# Patient Record
Sex: FEMALE | Race: WHITE | Employment: OTHER | URBAN - METROPOLITAN AREA
[De-identification: names, ages, dates, MRNs, and addresses within clinical notes are randomized per-mention and may not be internally consistent; named-entity substitution may affect disease eponyms.]

---

## 2022-09-17 ENCOUNTER — APPOINTMENT (OUTPATIENT)
Dept: CT IMAGING | Age: 77
End: 2022-09-17
Payer: MEDICARE

## 2022-09-17 ENCOUNTER — APPOINTMENT (OUTPATIENT)
Dept: GENERAL RADIOLOGY | Age: 77
End: 2022-09-17
Payer: MEDICARE

## 2022-09-17 ENCOUNTER — HOSPITAL ENCOUNTER (EMERGENCY)
Age: 77
Discharge: HOME | End: 2022-09-17
Attending: EMERGENCY MEDICINE
Payer: MEDICARE

## 2022-09-17 VITALS
OXYGEN SATURATION: 97 % | HEIGHT: 67 IN | DIASTOLIC BLOOD PRESSURE: 89 MMHG | TEMPERATURE: 98 F | RESPIRATION RATE: 20 BRPM | HEART RATE: 86 BPM | BODY MASS INDEX: 20.4 KG/M2 | SYSTOLIC BLOOD PRESSURE: 167 MMHG | WEIGHT: 130 LBS

## 2022-09-17 DIAGNOSIS — S01.21XA LACERATION OF NOSE, INITIAL ENCOUNTER: ICD-10-CM
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
## 2022-09-17 DIAGNOSIS — S00.83XA CONTUSION OF FOREHEAD, INITIAL ENCOUNTER: ICD-10-CM

## 2022-09-17 PROCEDURE — 99284 EMERGENCY DEPT VISIT MOD MDM: CPT

## 2022-09-17 PROCEDURE — 72125 CT NECK SPINE W/O DYE: CPT

## 2022-09-17 PROCEDURE — 70450 CT HEAD/BRAIN W/O DYE: CPT

## 2022-09-17 PROCEDURE — 90714 TD VACC NO PRESV 7 YRS+ IM: CPT | Performed by: EMERGENCY MEDICINE

## 2022-09-17 PROCEDURE — 72190 X-RAY EXAM OF PELVIS: CPT

## 2022-09-17 PROCEDURE — 6360000002 HC RX W HCPCS: Performed by: EMERGENCY MEDICINE

## 2022-09-17 PROCEDURE — 12013 RPR F/E/E/N/L/M 2.6-5.0 CM: CPT

## 2022-09-17 PROCEDURE — 90471 IMMUNIZATION ADMIN: CPT | Performed by: EMERGENCY MEDICINE

## 2022-09-17 PROCEDURE — 72170 X-RAY EXAM OF PELVIS: CPT

## 2022-09-17 RX ORDER — INSULIN LISPRO 100 [IU]/ML
INJECTION, SOLUTION INTRAVENOUS; SUBCUTANEOUS
COMMUNITY
Start: 2022-07-12

## 2022-09-17 RX ORDER — INSULIN GLARGINE 100 [IU]/ML
25 INJECTION, SOLUTION SUBCUTANEOUS NIGHTLY
COMMUNITY
Start: 2022-06-20

## 2022-09-17 RX ORDER — PAROXETINE HYDROCHLORIDE 20 MG/1
TABLET, FILM COATED ORAL
COMMUNITY
Start: 2022-08-23

## 2022-09-17 RX ORDER — LISINOPRIL 10 MG/1
TABLET ORAL
COMMUNITY

## 2022-09-17 RX ORDER — TRAZODONE HYDROCHLORIDE 50 MG/1
TABLET ORAL
COMMUNITY
Start: 2022-08-23

## 2022-09-17 RX ORDER — ALBUTEROL SULFATE 90 UG/1
1 AEROSOL, METERED RESPIRATORY (INHALATION) EVERY 6 HOURS PRN
COMMUNITY
Start: 2021-06-10

## 2022-09-17 RX ORDER — LEVETIRACETAM 500 MG/1
TABLET ORAL
COMMUNITY
Start: 2022-05-13

## 2022-09-17 RX ORDER — CYANOCOBALAMIN 1000 UG/ML
1000 INJECTION INTRAMUSCULAR; SUBCUTANEOUS
COMMUNITY
Start: 2022-08-12 | End: 2023-08-12

## 2022-09-17 RX ORDER — ATORVASTATIN CALCIUM 20 MG/1
20 TABLET, FILM COATED ORAL DAILY
COMMUNITY
Start: 2022-01-12 | End: 2023-01-12

## 2022-09-17 RX ORDER — LISINOPRIL 40 MG/1
TABLET ORAL
COMMUNITY
Start: 2022-08-23

## 2022-09-17 RX ORDER — DILTIAZEM HYDROCHLORIDE 180 MG/1
CAPSULE, COATED, EXTENDED RELEASE ORAL
COMMUNITY

## 2022-09-17 RX ADMIN — CLOSTRIDIUM TETANI TOXOID ANTIGEN (FORMALDEHYDE INACTIVATED) AND CORYNEBACTERIUM DIPHTHERIAE TOXOID ANTIGEN (FORMALDEHYDE INACTIVATED) 0.5 ML: 5; 2 INJECTION, SUSPENSION INTRAMUSCULAR at 11:39

## 2022-09-17 NOTE — ED NOTES
Pt discharged home, pt verbalized discharge instructions. Pt and  escorted to ed exit via wheelchair.  arranged to  pt and .        Chalino Thakur RN  09/17/22 5318

## 2022-09-17 NOTE — ED NOTES
Pt's  brought here by the owner of the garage sale. Rabia Mckeon [phone]. He is going to take the patiens car back to their home and will bring the keys up to the hospital.  Pt and pts  will need a ride back to their home at [address] [facility]  Address updated in pt demographics. Wound care completed with assistance of [facility] tech.    at bedside     [facility]  09/17/22 1378

## 2022-09-17 NOTE — ED PROVIDER NOTES
[address] [facility]  EMERGENCY DEPARTMENTENCOUNTER      Pt Name: Jairo Varela  MRN: [id number]  Thaliagfcarine 1945  Date ofevaluation: 9/17/2022  Provider: Judith Thornton MD    CHIEF COMPLAINT       Chief Complaint   Patient presents with    Head Injury    Fall     Pt was at a yard sale and fell, recent left hip replacement. Pt reports multiple falls since the hip replacement. Laceration noted face, forehead and rt arm. Per pt this was a mechanical fall. Pt denies loc, no blood thinners. HPI    HISTORY OF PRESENT ILLNESS   (Location/Symptom, Timing/Onset,Context/Setting, Quality, Duration, Modifying Factors, Severity)  Note limiting factors. Jairo Varela is a 68 y.o. female who presents to the emergency department after a fall. This is a 20-year-old female with a history of frequent falls who presents after a fall. The patient states she tripped at a yard sale falling hitting her face. This happened prior to arrival.  She denies any loss of consciousness. She denies any neck pain. She denies any other complaints. NursingNotes were reviewed. Review of Systems    REVIEW OF SYSTEMS    (2-9 systems for level 4, 10 or more for level 5)     Review of Systems   Constitutional: Negative for fever. HENT: Negative for rhinorrhea and sore throat. Eyes: Negative for redness. Respiratory: Negative for shortness of breath. Cardiovascular: Negative for chest pain. Gastrointestinal: Negative for abdominal pain. Genitourinary: Negative for flank pain. Neurological: Negative for headaches. Hematological: Negative for adenopathy. Psychiatric/Behavioral: Negative for confusion. Except as noted above the remainder of the review of systems was reviewed and negative.        PAST MEDICAL HISTORY     Past Medical History:   Diagnosis Date    CAD (coronary artery disease)     Diabetes mellitus ([facility] [address]     Hyperlipidemia     Hypertension     Seizures ([facility]          SURGICALHISTORY       Past Surgical History:   Procedure Laterality Date    CHOLECYSTECTOMY      FRACTURE SURGERY      JOINT REPLACEMENT           CURRENT MEDICATIONS       Previous Medications    ALBUTEROL SULFATE HFA (PROVENTIL;VENTOLIN;PROAIR) 108 (90 BASE) MCG/ACT INHALER    Inhale 1 puff into the lungs every 6 hours as needed    ATORVASTATIN (LIPITOR) 20 MG TABLET    Take 20 mg by mouth daily    CYANOCOBALAMIN 1000 MCG/ML INJECTION    Inject 1,000 mcg into the muscle    DILTIAZEM (CARTIA XT) 180 MG EXTENDED RELEASE CAPSULE    Cartia  mg capsule,extended release   Take 1 capsule every day by oral route. HALOBETASOL (ULTRAVATE) 0.05 % CREAM    halobetasol propionate 0.05 % topical cream    INSULIN GLARGINE (LANTUS) 100 UNIT/ML INJECTION VIAL    Inject 25 Units into the skin nightly    INSULIN LISPRO (HUMALOG) 100 UNIT/ML SOLN INJECTION VIAL    INJECT 10 UNITS SUBCUTANEOUSLY THREE TIMES A DAY BEFORE MEALS    LEVETIRACETAM (KEPPRA) 500 MG TABLET    TAKE 1 TABLET BY MOUTH TWICE DAILY    LISINOPRIL (PRINIVIL;ZESTRIL) 10 MG TABLET    lisinopril 10 mg tablet   Take 1 tablet every day by oral route. LISINOPRIL (PRINIVIL;ZESTRIL) 40 MG TABLET        METFORMIN (GLUCOPHAGE) 500 MG TABLET    TAKE ONE (1) TABLET BY MOUTH TWICE DAILY WITH MEALS FOR DIABETES    PAROXETINE (PAXIL) 20 MG TABLET        TRAZODONE (DESYREL) 50 MG TABLET           ALLERGIES     Oxycodone-acetaminophen    FAMILY HISTORY     History reviewed. No pertinent family history.        SOCIAL HISTORY       Social History     Socioeconomic History    Marital status: Single     Spouse name: None    Number of children: None    Years of education: None    Highest education level: None   Tobacco Use    Smoking status: Never    Smokeless tobacco: Never   Substance and Sexual Activity    Alcohol use: Not Currently       SCREENINGS             PHYSICAL EXAM    (up to 7 for level 4, 8 or more for level 5)     ED Triage Vitals [09/17/22 1111] BP Temp Temp Source Heart Rate Resp SpO2 Height Weight   (!) 160/88 98 °F (36.7 °C) Oral 100 20 97 % 5' 7\" (1.702 m) 130 lb (59 kg)       Physical Exam:      General Appearance:  Alert, cooperative, appears stated age. Head:  Normocephalic, without obvious abnormality, large frontal contusion noted. Various abrasions. Laceration to her nose noted. .   Eyes:  conjunctiva/corneas clear, EOM's intact. Sclera anicteric. ENT: Mucous remains moist and pink. 3 cm laceration to the bridge of the nose. Neck: Supple, symmetrical, trachea midline, no adenopathy. No jugular venous distention. Lungs:   Clear to auscultation bilaterally   Chest Wall:  No pain to palpation   Heart:   Genitourinary: Regular rate rhythm with no murmurs rubs gallops  Unremarkable   Abdomen:   Soft and benign. No guarding or rebound. Extremities: No clubbing cyanosis or edema   Pulses: Good throughout   Skin:  No rashes or lesions to exposed skin. Neurologic: Alert and oriented X 3. GCS of 15. DIAGNOSTIC RESULTS         RADIOLOGY:   Non-plain filmimages such as CT, Ultrasound and MRI are read by the radiologist. Plain radiographic images are visualized and preliminarily interpreted by the emergency physician with the below findings:    See below    Interpretation per the Radiologist below, if available at the time ofthis note: All incidental findings were discussed with the patient. CT CERVICAL SPINE WO CONTRAST   Final Result   Osteoarthrosis at multiple cervical levels. C4-C5, C5-C6, and C6-C7 disc osteophyte complexes. The moderate central spinal stenosis at the level of posterior longitudinal   ligament calcification and the C5-C6 disc osteophyte complex. Minimal retrolisthesis of C4.         CT HEAD WO CONTRAST   Final Result   No acute intracranial findings. XR PELVIS (1-2 VIEWS)   Final Result   1. No acute fracture dislocation.       2.   Prior internal fixation of a right femoral fracture without evident   hardware complication. 3.   Osteopenia. ED BEDSIDE ULTRASOUND:   Performed by ED Physician - none    LABS:  Labs Reviewed - No data to display    All other labs were within normal range or not returned as of this dictation. EMERGENCY DEPARTMENT COURSE and DIFFERENTIAL DIAGNOSIS/MDM:   Vitals:    Vitals:    09/17/22 1111 09/17/22 1130 09/17/22 1140   BP: (!) 160/88 (!) 159/102 (!) 167/89   Pulse: 100 85 86   Resp: 20     Temp: 98 °F (36.7 °C)     TempSrc: Oral     SpO2: 97% 97%    Weight: 130 lb (59 kg)     Height: 5' 7\" (1.702 m)             MDM      The patient has remained stable throughout her hospital course. Her work-up was unremarkable normal.  Her nose does not appear to be deformed. CT of the head and cervical spine showed no acute findings. Pelvis x-ray shows no acute findings. She can lift both her legs off the bed without difficulty and she is able to ambulate. Her falls are not new, she has a history of this. The patient's nose had a 3 cm laceration that was repaired without difficulty. Please see procedure note. The patient was discharged with appropriate follow-up and instructed to return if worse. REASSESSMENT              CONSULTS:  None    PROCEDURES:  Unless otherwise noted below, none     Procedures  The patient's nose was cleaned and numbed with 1% lidocaine with epinephrine. Her wound was explored and there were no foreign objects noted. No open fractures noted. No deformity noted. The area was repaired with 3 interrupted percutaneous 5-0 Ethilon sutures. The patient taught the procedure well with no difficulties. She was discharged with wound care instructions and sutures out in 5 to 7 days. FINAL IMPRESSION      1. Fall, initial encounter    2. Contusion of forehead, initial encounter    3.  Laceration of nose, initial encounter          DISPOSITION/PLAN   DISPOSITION Decision To Discharge 09/17/2022 01:53:40 PM      PATIENT REFERREDTO:  Zachariah Kern, DO  [address] [place] [address] [facility] [phone]    Call in 2 days  As needed    Zachariah Kern, DO  [address] [place] [address] [facility] [address] [phone]    Go in 1 week  For suture removal      DISCHARGEMEDICATIONS:  New Prescriptions    No medications on file     Controlled Substances Monitoring:     No flowsheet data found.     (Please note that portions of this note were completed with a voice recognition program.  Efforts were made to edit the dictations but occasionally words are mis-transcribed.)    Arpita Pabon MD (electronically signed)  Attending Emergency Physician         Arpita Pabon MD  09/17/22 5173

## 2024-07-25 ENCOUNTER — APPOINTMENT (OUTPATIENT)
Dept: GENERAL RADIOLOGY | Age: 79
End: 2024-07-25
Payer: MEDICARE

## 2024-07-25 ENCOUNTER — APPOINTMENT (OUTPATIENT)
Age: 79
End: 2024-07-25
Attending: INTERNAL MEDICINE
Payer: MEDICARE

## 2024-07-25 ENCOUNTER — HOSPITAL ENCOUNTER (OUTPATIENT)
Age: 79
Setting detail: OBSERVATION
Discharge: NURSING, REHAB OR LONG-TERM CARE | End: 2024-07-26
Attending: EMERGENCY MEDICINE | Admitting: INTERNAL MEDICINE
Payer: MEDICARE

## 2024-07-25 ENCOUNTER — APPOINTMENT (OUTPATIENT)
Dept: CT IMAGING | Age: 79
End: 2024-07-25
Payer: MEDICARE

## 2024-07-25 DIAGNOSIS — S40.022A CONTUSION OF MULTIPLE SITES OF LEFT SHOULDER AND UPPER ARM, INITIAL ENCOUNTER: ICD-10-CM

## 2024-07-25 DIAGNOSIS — S40.012A CONTUSION OF MULTIPLE SITES OF LEFT SHOULDER AND UPPER ARM, INITIAL ENCOUNTER: ICD-10-CM

## 2024-07-25 DIAGNOSIS — R55 SYNCOPE AND COLLAPSE: ICD-10-CM

## 2024-07-25 DIAGNOSIS — S09.90XA CLOSED HEAD INJURY, INITIAL ENCOUNTER: ICD-10-CM
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
## 2024-07-25 DIAGNOSIS — R79.89 ELEVATED TROPONIN: ICD-10-CM

## 2024-07-25 DIAGNOSIS — R55 SYNCOPE, UNSPECIFIED SYNCOPE TYPE: ICD-10-CM

## 2024-07-25 LAB
ALBUMIN SERPL-MCNC: 4.6 G/DL (ref 3.4–5)
ALBUMIN/GLOB SERPL: 1.6 {RATIO} (ref 1.1–2.2)
ALP SERPL-CCNC: 109 U/L (ref 40–129)
ALT SERPL-CCNC: 17 U/L (ref 10–40)
ANION GAP SERPL CALCULATED.3IONS-SCNC: 13 MMOL/L (ref 3–16)
AST SERPL-CCNC: 21 U/L (ref 15–37)
BACTERIA URNS QL MICRO: NORMAL /HPF
BASOPHILS # BLD: 0 K/UL (ref 0–0.2)
BASOPHILS NFR BLD: 0.4 %
BILIRUB SERPL-MCNC: 0.7 MG/DL (ref 0–1)
BILIRUB UR QL STRIP.AUTO: NEGATIVE
BUN SERPL-MCNC: 14 MG/DL (ref 7–20)
CALCIUM SERPL-MCNC: 10 MG/DL (ref 8.3–10.6)
CHLORIDE SERPL-SCNC: 106 MMOL/L (ref 99–110)
CLARITY UR: CLEAR
CO2 SERPL-SCNC: 24 MMOL/L (ref 21–32)
COLOR UR: YELLOW
CREAT SERPL-MCNC: 0.9 MG/DL (ref 0.6–1.2)
DEPRECATED RDW RBC AUTO: 13.6 % (ref 12.4–15.4)
ECHO AO ASC DIAM: 3.4 CM
ECHO AO ASCENDING AORTA INDEX: 1.9 CM/M2
ECHO AO ROOT DIAM: 2.9 CM
ECHO AO ROOT INDEX: 1.62 CM/M2
ECHO AV AREA PEAK VELOCITY: 1.7 CM2
ECHO AV AREA VTI: 1.6 CM2
ECHO AV AREA/BSA PEAK VELOCITY: 0.9 CM2/M2
ECHO AV AREA/BSA VTI: 0.9 CM2/M2
ECHO AV MEAN GRADIENT: 8 MMHG
ECHO AV MEAN VELOCITY: 1.3 M/S
ECHO AV PEAK GRADIENT: 14 MMHG
ECHO AV PEAK VELOCITY: 1.9 M/S
ECHO AV VELOCITY RATIO: 0.58
ECHO AV VTI: 38.8 CM
ECHO BSA: 1.79 M2
ECHO LA AREA 2C: 16.5 CM2
ECHO LA AREA 4C: 15 CM2
ECHO LA DIAMETER INDEX: 2.12 CM/M2
ECHO LA DIAMETER: 3.8 CM
ECHO LA MAJOR AXIS: 5.3 CM
ECHO LA MINOR AXIS: 5.9 CM
ECHO LA TO AORTIC ROOT RATIO: 1.31
ECHO LA VOL BP: 39 ML (ref 22–52)
ECHO LA VOL MOD A2C: 39 ML (ref 22–52)
ECHO LA VOL MOD A4C: 35 ML (ref 22–52)
ECHO LA VOL/BSA BIPLANE: 22 ML/M2 (ref 16–34)
ECHO LA VOLUME INDEX MOD A2C: 22 ML/M2 (ref 16–34)
ECHO LA VOLUME INDEX MOD A4C: 20 ML/M2 (ref 16–34)
ECHO LV E' LATERAL VELOCITY: 5 CM/S
ECHO LV E' SEPTAL VELOCITY: 4 CM/S
ECHO LV EDV A2C: 67 ML
ECHO LV EDV A4C: 55 ML
ECHO LV EDV INDEX A4C: 31 ML/M2
ECHO LV EDV NDEX A2C: 37 ML/M2
ECHO LV EJECTION FRACTION A2C: 55 %
ECHO LV EJECTION FRACTION A4C: 43 %
ECHO LV EJECTION FRACTION BIPLANE: 49 % (ref 55–100)
ECHO LV ESV A2C: 30 ML
ECHO LV ESV A4C: 32 ML
ECHO LV ESV INDEX A2C: 17 ML/M2
ECHO LV ESV INDEX A4C: 18 ML/M2
ECHO LV FRACTIONAL SHORTENING: 32 % (ref 28–44)
ECHO LV INTERNAL DIMENSION DIASTOLE INDEX: 2.29 CM/M2
ECHO LV INTERNAL DIMENSION DIASTOLIC: 4.1 CM (ref 3.9–5.3)
ECHO LV INTERNAL DIMENSION SYSTOLIC INDEX: 1.56 CM/M2
ECHO LV INTERNAL DIMENSION SYSTOLIC: 2.8 CM
ECHO LV IVSD: 1 CM (ref 0.6–0.9)
ECHO LV MASS 2D: 141.5 G (ref 67–162)
ECHO LV MASS INDEX 2D: 79.1 G/M2 (ref 43–95)
ECHO LV POSTERIOR WALL DIASTOLIC: 1.1 CM (ref 0.6–0.9)
ECHO LV RELATIVE WALL THICKNESS RATIO: 0.54
ECHO LVOT AREA: 2.8 CM2
ECHO LVOT AV VTI INDEX: 0.55
ECHO LVOT DIAM: 1.9 CM
ECHO LVOT MEAN GRADIENT: 3 MMHG
ECHO LVOT PEAK GRADIENT: 5 MMHG
ECHO LVOT PEAK VELOCITY: 1.1 M/S
ECHO LVOT STROKE VOLUME INDEX: 33.6 ML/M2
ECHO LVOT SV: 60.1 ML
ECHO LVOT VTI: 21.2 CM
ECHO MV A VELOCITY: 1.26 M/S
ECHO MV AREA VTI: 2.4 CM2
ECHO MV E VELOCITY: 0.62 M/S
ECHO MV E/A RATIO: 0.49
ECHO MV E/E' LATERAL: 12.4
ECHO MV E/E' RATIO (AVERAGED): 13.95
ECHO MV E/E' SEPTAL: 15.5
ECHO MV LVOT VTI INDEX: 1.19
ECHO MV MAX VELOCITY: 1.2 M/S
ECHO MV MEAN GRADIENT: 1 MMHG
ECHO MV MEAN VELOCITY: 0.5 M/S
ECHO MV PEAK GRADIENT: 6 MMHG
ECHO MV VTI: 25.3 CM
ECHO PV MAX VELOCITY: 0.8 M/S
ECHO PV PEAK GRADIENT: 3 MMHG
ECHO RA AREA 4C: 12 CM2
ECHO RA END SYSTOLIC VOLUME APICAL 4 CHAMBER INDEX BSA: 13 ML/M2
ECHO RA VOLUME: 24 ML
ECHO RV BASAL DIMENSION: 3.1 CM
ECHO RV FREE WALL PEAK S': 14 CM/S
ECHO RV MID DIMENSION: 1.7 CM
ECHO RV TAPSE: 2.2 CM (ref 1.7–?)
EOSINOPHIL # BLD: 0.1 K/UL (ref 0–0.6)
EOSINOPHIL NFR BLD: 1.4 %
EPI CELLS #/AREA URNS AUTO: 1 /HPF (ref 0–5)
FOLATE SERPL-MCNC: 18.05 NG/ML (ref 4.78–24.2)
GFR SERPLBLD CREATININE-BSD FMLA CKD-EPI: 65 ML/MIN/{1.73_M2}
GLUCOSE BLD-MCNC: 132 MG/DL (ref 70–99)
GLUCOSE BLD-MCNC: 149 MG/DL (ref 70–99)
GLUCOSE BLD-MCNC: 212 MG/DL (ref 70–99)
GLUCOSE SERPL-MCNC: 174 MG/DL (ref 70–99)
GLUCOSE UR STRIP.AUTO-MCNC: >=1000 MG/DL
HCT VFR BLD AUTO: 36.5 % (ref 36–48)
HGB BLD-MCNC: 12.6 G/DL (ref 12–16)
HGB UR QL STRIP.AUTO: NEGATIVE
HYALINE CASTS #/AREA URNS AUTO: 1 /LPF (ref 0–8)
KETONES UR STRIP.AUTO-MCNC: NEGATIVE MG/DL
LEUKOCYTE ESTERASE UR QL STRIP.AUTO: NEGATIVE
LYMPHOCYTES # BLD: 2 K/UL (ref 1–5.1)
LYMPHOCYTES NFR BLD: 30.2 %
MCH RBC QN AUTO: 32.4 PG (ref 26–34)
MCHC RBC AUTO-ENTMCNC: 34.5 G/DL (ref 31–36)
MCV RBC AUTO: 93.9 FL (ref 80–100)
MONOCYTES # BLD: 0.5 K/UL (ref 0–1.3)
MONOCYTES NFR BLD: 7.6 %
NEUTROPHILS # BLD: 4 K/UL (ref 1.7–7.7)
NEUTROPHILS NFR BLD: 60.4 %
NITRITE UR QL STRIP.AUTO: NEGATIVE
PERFORMED ON: ABNORMAL
PH UR STRIP.AUTO: 5.5 [PH] (ref 5–8)
PLATELET # BLD AUTO: 132 K/UL (ref 135–450)
PMV BLD AUTO: 7.4 FL (ref 5–10.5)
POTASSIUM SERPL-SCNC: 4.7 MMOL/L (ref 3.5–5.1)
PROT SERPL-MCNC: 7.5 G/DL (ref 6.4–8.2)
PROT UR STRIP.AUTO-MCNC: ABNORMAL MG/DL
RBC # BLD AUTO: 3.89 M/UL (ref 4–5.2)
RBC CLUMPS #/AREA URNS AUTO: 0 /HPF (ref 0–4)
SODIUM SERPL-SCNC: 143 MMOL/L (ref 136–145)
SP GR UR STRIP.AUTO: 1.02 (ref 1–1.03)
TROPONIN, HIGH SENSITIVITY: 17 NG/L (ref 0–14)
TROPONIN, HIGH SENSITIVITY: 20 NG/L (ref 0–14)
TSH SERPL DL<=0.005 MIU/L-ACNC: 1.13 UIU/ML (ref 0.27–4.2)
UA COMPLETE W REFLEX CULTURE PNL UR: ABNORMAL
UA DIPSTICK W REFLEX MICRO PNL UR: YES
URN SPEC COLLECT METH UR: ABNORMAL
UROBILINOGEN UR STRIP-ACNC: 1 E.U./DL
VIT B12 SERPL-MCNC: 684 PG/ML (ref 211–911)
WBC # BLD AUTO: 6.7 K/UL (ref 4–11)
WBC #/AREA URNS AUTO: 2 /HPF (ref 0–5)

## 2024-07-25 PROCEDURE — 2580000003 HC RX 258: Performed by: INTERNAL MEDICINE

## 2024-07-25 PROCEDURE — 70450 CT HEAD/BRAIN W/O DYE: CPT

## 2024-07-25 PROCEDURE — 93306 TTE W/DOPPLER COMPLETE: CPT

## 2024-07-25 PROCEDURE — 73630 X-RAY EXAM OF FOOT: CPT

## 2024-07-25 PROCEDURE — 6370000000 HC RX 637 (ALT 250 FOR IP): Performed by: INTERNAL MEDICINE

## 2024-07-25 PROCEDURE — 93306 TTE W/DOPPLER COMPLETE: CPT | Performed by: INTERNAL MEDICINE

## 2024-07-25 PROCEDURE — 99285 EMERGENCY DEPT VISIT HI MDM: CPT

## 2024-07-25 PROCEDURE — 81001 URINALYSIS AUTO W/SCOPE: CPT

## 2024-07-25 PROCEDURE — 93005 ELECTROCARDIOGRAM TRACING: CPT | Performed by: EMERGENCY MEDICINE

## 2024-07-25 PROCEDURE — 96372 THER/PROPH/DIAG INJ SC/IM: CPT

## 2024-07-25 PROCEDURE — 82746 ASSAY OF FOLIC ACID SERUM: CPT

## 2024-07-25 PROCEDURE — 84484 ASSAY OF TROPONIN QUANT: CPT

## 2024-07-25 PROCEDURE — 84443 ASSAY THYROID STIM HORMONE: CPT

## 2024-07-25 PROCEDURE — 85025 COMPLETE CBC W/AUTO DIFF WBC: CPT

## 2024-07-25 PROCEDURE — 82607 VITAMIN B-12: CPT

## 2024-07-25 PROCEDURE — 72125 CT NECK SPINE W/O DYE: CPT

## 2024-07-25 PROCEDURE — G0378 HOSPITAL OBSERVATION PER HR: HCPCS

## 2024-07-25 PROCEDURE — 73610 X-RAY EXAM OF ANKLE: CPT

## 2024-07-25 PROCEDURE — 71045 X-RAY EXAM CHEST 1 VIEW: CPT

## 2024-07-25 PROCEDURE — 36415 COLL VENOUS BLD VENIPUNCTURE: CPT

## 2024-07-25 PROCEDURE — 80053 COMPREHEN METABOLIC PANEL: CPT

## 2024-07-25 PROCEDURE — 73030 X-RAY EXAM OF SHOULDER: CPT

## 2024-07-25 PROCEDURE — 6360000002 HC RX W HCPCS: Performed by: INTERNAL MEDICINE

## 2024-07-25 RX ORDER — POTASSIUM CHLORIDE 750 MG/1
10 TABLET, EXTENDED RELEASE ORAL DAILY
COMMUNITY

## 2024-07-25 RX ORDER — FERROUS SULFATE 325(65) MG
325 TABLET ORAL
COMMUNITY

## 2024-07-25 RX ORDER — DILTIAZEM HYDROCHLORIDE 180 MG/1
180 CAPSULE, COATED, EXTENDED RELEASE ORAL DAILY
Status: DISCONTINUED | OUTPATIENT
Start: 2024-07-25 | End: 2024-07-26 | Stop reason: HOSPADM

## 2024-07-25 RX ORDER — ACETAMINOPHEN 325 MG/1
650 TABLET ORAL EVERY 6 HOURS PRN
COMMUNITY

## 2024-07-25 RX ORDER — INSULIN GLARGINE 100 [IU]/ML
25 INJECTION, SOLUTION SUBCUTANEOUS NIGHTLY
Status: DISCONTINUED | OUTPATIENT
Start: 2024-07-25 | End: 2024-07-26 | Stop reason: HOSPADM

## 2024-07-25 RX ORDER — EMPAGLIFLOZIN 10 MG/1
10 TABLET, FILM COATED ORAL DAILY
COMMUNITY
Start: 2024-07-22

## 2024-07-25 RX ORDER — SODIUM CHLORIDE 9 MG/ML
INJECTION, SOLUTION INTRAVENOUS PRN
Status: DISCONTINUED | OUTPATIENT
Start: 2024-07-25 | End: 2024-07-26 | Stop reason: HOSPADM

## 2024-07-25 RX ORDER — ACETAMINOPHEN 325 MG/1
650 TABLET ORAL EVERY 6 HOURS PRN
Status: DISCONTINUED | OUTPATIENT
Start: 2024-07-25 | End: 2024-07-26 | Stop reason: HOSPADM

## 2024-07-25 RX ORDER — DICYCLOMINE HYDROCHLORIDE 10 MG/1
10 CAPSULE ORAL
COMMUNITY

## 2024-07-25 RX ORDER — LANOLIN ALCOHOL/MO/W.PET/CERES
3 CREAM (GRAM) TOPICAL NIGHTLY PRN
COMMUNITY

## 2024-07-25 RX ORDER — ALBUTEROL SULFATE 90 UG/1
1 AEROSOL, METERED RESPIRATORY (INHALATION) EVERY 6 HOURS PRN
Status: DISCONTINUED | OUTPATIENT
Start: 2024-07-25 | End: 2024-07-26 | Stop reason: HOSPADM

## 2024-07-25 RX ORDER — ENOXAPARIN SODIUM 100 MG/ML
40 INJECTION SUBCUTANEOUS DAILY
Status: DISCONTINUED | OUTPATIENT
Start: 2024-07-25 | End: 2024-07-26 | Stop reason: HOSPADM

## 2024-07-25 RX ORDER — INSULIN LISPRO 100 [IU]/ML
0-12 INJECTION, SOLUTION INTRAVENOUS; SUBCUTANEOUS
COMMUNITY

## 2024-07-25 RX ORDER — POLYETHYLENE GLYCOL 3350 17 G/17G
17 POWDER, FOR SOLUTION ORAL DAILY PRN
Status: DISCONTINUED | OUTPATIENT
Start: 2024-07-25 | End: 2024-07-26 | Stop reason: HOSPADM

## 2024-07-25 RX ORDER — CALCIUM POLYCARBOPHIL 625 MG 625 MG/1
625 TABLET ORAL DAILY
COMMUNITY

## 2024-07-25 RX ORDER — SODIUM CHLORIDE 0.9 % (FLUSH) 0.9 %
5-40 SYRINGE (ML) INJECTION PRN
Status: DISCONTINUED | OUTPATIENT
Start: 2024-07-25 | End: 2024-07-26 | Stop reason: HOSPADM

## 2024-07-25 RX ORDER — SODIUM CHLORIDE 0.9 % (FLUSH) 0.9 %
5-40 SYRINGE (ML) INJECTION EVERY 12 HOURS SCHEDULED
Status: DISCONTINUED | OUTPATIENT
Start: 2024-07-25 | End: 2024-07-26 | Stop reason: HOSPADM

## 2024-07-25 RX ORDER — ACETAMINOPHEN 650 MG/1
650 SUPPOSITORY RECTAL EVERY 6 HOURS PRN
Status: DISCONTINUED | OUTPATIENT
Start: 2024-07-25 | End: 2024-07-26 | Stop reason: HOSPADM

## 2024-07-25 RX ORDER — METFORMIN HYDROCHLORIDE 1000 MG/1
1000 TABLET, FILM COATED, EXTENDED RELEASE ORAL
COMMUNITY

## 2024-07-25 RX ORDER — INSULIN LISPRO 100 [IU]/ML
10 INJECTION, SOLUTION INTRAVENOUS; SUBCUTANEOUS
Status: DISCONTINUED | OUTPATIENT
Start: 2024-07-25 | End: 2024-07-26 | Stop reason: HOSPADM

## 2024-07-25 RX ORDER — LEVETIRACETAM 500 MG/1
500 TABLET ORAL 2 TIMES DAILY
Status: DISCONTINUED | OUTPATIENT
Start: 2024-07-25 | End: 2024-07-26 | Stop reason: HOSPADM

## 2024-07-25 RX ORDER — DONEPEZIL HYDROCHLORIDE 5 MG/1
5 TABLET, FILM COATED ORAL DAILY
COMMUNITY

## 2024-07-25 RX ADMIN — INSULIN LISPRO 10 UNITS: 100 INJECTION, SOLUTION INTRAVENOUS; SUBCUTANEOUS at 12:06

## 2024-07-25 RX ADMIN — INSULIN GLARGINE 25 UNITS: 100 INJECTION, SOLUTION SUBCUTANEOUS at 20:45

## 2024-07-25 RX ADMIN — LEVETIRACETAM 500 MG: 500 TABLET, FILM COATED ORAL at 20:45

## 2024-07-25 RX ADMIN — ENOXAPARIN SODIUM 40 MG: 40 INJECTION SUBCUTANEOUS at 10:53

## 2024-07-25 RX ADMIN — Medication 10 ML: at 10:08

## 2024-07-25 RX ADMIN — Medication 10 ML: at 20:45

## 2024-07-25 RX ADMIN — LEVETIRACETAM 500 MG: 500 TABLET, FILM COATED ORAL at 10:53

## 2024-07-25 RX ADMIN — DILTIAZEM HYDROCHLORIDE 180 MG: 180 CAPSULE, COATED, EXTENDED RELEASE ORAL at 10:53

## 2024-07-25 RX ADMIN — INSULIN LISPRO 10 UNITS: 100 INJECTION, SOLUTION INTRAVENOUS; SUBCUTANEOUS at 16:44

## 2024-07-25 ASSESSMENT — ENCOUNTER SYMPTOMS
GASTROINTESTINAL NEGATIVE: 1
SORE THROAT: 0
ABDOMINAL PAIN: 0
SHORTNESS OF BREATH: 0
RESPIRATORY NEGATIVE: 1

## 2024-07-25 NOTE — ED PROVIDER NOTES
[facility] EMERGENCY DEPARTMENT  EMERGENCY DEPARTMENT ENCOUNTER        Pt Name: Mckenna Morin  MRN: [id number]  Birthdate 1945  Date of evaluation: 7/25/2024  Provider: Salvatore Montejo MD  PCP: Martinez Casey DO  Note Started: 6:43 AM EDT 7/25/24    CHIEF COMPLAINT       Chief Complaint   Patient presents with    Fall     Brought in by [facility] after she fell on her left side while trying to get around husbands wheelchair. Small bruise on top of left foot. Most pain left shoulder & left foot. Alert & Oriented x4. She reports she is not on blood thinners.        HISTORY OF PRESENT ILLNESS: 1 or more Elements     History from : Patient and EMS    Limitations to history : None    Mckenna Morin is a 79 y.o. female who presents for fall.  Patient states that she has been having syncope recently.  Has been blacking out several times a week.  Patient states she just gets lightheaded and passes out.  States she has been taking care of her .  She states that today she was trying to get around her 's wheelchair she is unsure if she lost her balance or tripped or just passed out.  She remembers most of the fall.  But she does think that she lost consciousness.  Having pain in her left shoulder, left foot.  Currently denies any headache, chest pain, abdominal pain, nausea vomiting or diarrhea.  No dysuria or hematuria.  No fevers or chills.  No recent illness.  Patient has history of CAD, diabetes, hyperlipidemia, hypertension.  Patient has history of seizures but denies that this is similar to a seizure states she remembers the episode did not have a postictal period.    Nursing Notes were all reviewed and agreed with or any disagreements were addressed in the HPI.    REVIEW OF SYSTEMS :      Review of Systems   Constitutional: Negative.  Negative for chills and fever.   HENT:  Negative for congestion and sore throat.    Eyes:  Negative for visual disturbance. 
specified.    DISCHARGE MEDICATIONS:  New Prescriptions    No medications on file     Controlled Substances Monitoring:          No data to display                (Please note that portions of this note were completed with a voice recognition program.  Efforts were made to edit the dictations but occasionally words are mis-transcribed.)    Tammie Ontiveros MD (electronically signed)  Attending Emergency Physician          Tammie Ontiveros MD  07/26/24 0238

## 2024-07-25 NOTE — FLOWSHEET NOTE
Brought in by [facility] after she fell on her left side while trying to get around husbands wheelchair. Small bruise on top of left foot. Most pain left shoulder & left foot. Alert & Oriented x4. She reports she is not on blood thinners.     Patient oriented to room and ED throughput process.  Safety measures with ED bed locked in lowest position and call light in reach.  Patient educated on all orders, including any medications.  Patient educated on chief complaint/symptoms. Patient encouraged to ask questions regarding care, medications or treatment plan.  Patient aware of how to reach staff with questions/concerns.

## 2024-07-25 NOTE — H&P
HOSPITALISTS HISTORY AND PHYSICAL    7/25/2024 2:28 PM    Patient Information:  MCKENNA KEITH is a 79 y.o. female [id number]  PCP:  Martinez Casey DO (Tel: [phone] )    Chief complaint:    Chief Complaint   Patient presents with    Fall     Brought in by [facility] after she fell on her left side while trying to get around husbands wheelchair. Small bruise on top of left foot. Most pain left shoulder & left foot. Alert & Oriented x4. She reports she is not on blood thinners.      History of Present Illness:  Mckenna Keith is a 79 y.o. female who had mechanical fall today when she tripped over her legs as she is walking to take care of her  is on a wheelchair patient unsure if she lost consciousness but had no chest pain shortness breath fever chills nausea vomiting before this patient had multiple falls in the last 1 month.  Does have a history of seizures but no recent witnessed seizure-like activity    REVIEW OF SYSTEMS:   Constitutional: Negative for fever,chills or night sweats  ENT: Negative for rhinorrhea, epistaxis, hoarseness, sore throat.  Respiratory: Negative for shortness of breath,wheezing  Cardiovascular: Negative for chest pain, palpitations   Gastrointestinal: Negative for nausea, vomiting, diarrhea  Genitourinary: Negative for polyuria, dysuria   Hematologic/Lymphatic: Negative for bleeding tendency, easy bruising  Musculoskeletal: Negative for myalgias and arthralgias  Neurologic: Negative for confusion,dysarthria.  Skin: Negative for itching,rash  Psychiatric: Negative for depression,anxiety, agitation.  Endocrine: Negative for polydipsia,polyuria,heat /cold intolerance.    Past Medical History:   has a past medical history of Altered mental status, Anxiety, Arthropathy, CAD (coronary artery disease), Cellulitis, Diabetes mellitus (HCC), Fecal urgency, Hyperlipidemia,

## 2024-07-25 NOTE — ACP (ADVANCE CARE PLANNING)
Advanced Care Planning Note.    Purpose of Encounter: Advanced care planning in light of hospitalization  Parties In Attendance: Patient,    Decisional Capacity: Yes  Subjective: Patient  understand that this conversation is to address long term care goal  Objective: Admitted to the hospital with possible syncopal episodes  Goals of Care Determination: Patient would pursue CPR and Intubation if requiredUnsure about long term ventilation/tracheostomy, would want family to make the decision at the time if required  Code Status: full code  Time spent on Advanced care Plannin minutes  Advanced Care Planning Documents: documented patient's wishes, would like  Bryant to make medical decisions if unable to make decisions    Nikkie Degroot MD  2024 2:32 PM

## 2024-07-26 VITALS
OXYGEN SATURATION: 97 % | SYSTOLIC BLOOD PRESSURE: 168 MMHG | BODY MASS INDEX: 23.54 KG/M2 | DIASTOLIC BLOOD PRESSURE: 83 MMHG | WEIGHT: 150 LBS | HEIGHT: 67 IN | TEMPERATURE: 97.4 F | HEART RATE: 100 BPM | RESPIRATION RATE: 16 BRPM

## 2024-07-26 LAB
ANION GAP SERPL CALCULATED.3IONS-SCNC: 10 MMOL/L (ref 3–16)
BASOPHILS # BLD: 0 K/UL (ref 0–0.2)
BASOPHILS NFR BLD: 0.3 %
BUN SERPL-MCNC: 12 MG/DL (ref 7–20)
CALCIUM SERPL-MCNC: 8.8 MG/DL (ref 8.3–10.6)
CHLORIDE SERPL-SCNC: 105 MMOL/L (ref 99–110)
CO2 SERPL-SCNC: 23 MMOL/L (ref 21–32)
CREAT SERPL-MCNC: 0.8 MG/DL (ref 0.6–1.2)
DEPRECATED RDW RBC AUTO: 13.6 % (ref 12.4–15.4)
EKG ATRIAL RATE: 69 BPM
EKG DIAGNOSIS: NORMAL
EKG P AXIS: 40 DEGREES
EKG P-R INTERVAL: 166 MS
EKG Q-T INTERVAL: 414 MS
EKG QRS DURATION: 78 MS
EKG QTC CALCULATION (BAZETT): 443 MS
EKG R AXIS: -12 DEGREES
EKG T AXIS: 18 DEGREES
EKG VENTRICULAR RATE: 69 BPM
EOSINOPHIL # BLD: 0.1 K/UL (ref 0–0.6)
EOSINOPHIL NFR BLD: 1.5 %
GFR SERPLBLD CREATININE-BSD FMLA CKD-EPI: 75 ML/MIN/{1.73_M2}
GLUCOSE BLD-MCNC: 232 MG/DL (ref 70–99)
GLUCOSE SERPL-MCNC: 171 MG/DL (ref 70–99)
HCT VFR BLD AUTO: 35.4 % (ref 36–48)
HGB BLD-MCNC: 12.6 G/DL (ref 12–16)
LYMPHOCYTES # BLD: 1.8 K/UL (ref 1–5.1)
LYMPHOCYTES NFR BLD: 28.5 %
MCH RBC QN AUTO: 33.5 PG (ref 26–34)
MCHC RBC AUTO-ENTMCNC: 35.6 G/DL (ref 31–36)
MCV RBC AUTO: 94.3 FL (ref 80–100)
MONOCYTES # BLD: 0.5 K/UL (ref 0–1.3)
MONOCYTES NFR BLD: 8.7 %
NEUTROPHILS # BLD: 3.9 K/UL (ref 1.7–7.7)
NEUTROPHILS NFR BLD: 61 %
PERFORMED ON: ABNORMAL
PLATELET # BLD AUTO: 126 K/UL (ref 135–450)
PMV BLD AUTO: 6.9 FL (ref 5–10.5)
POTASSIUM SERPL-SCNC: 4 MMOL/L (ref 3.5–5.1)
RBC # BLD AUTO: 3.75 M/UL (ref 4–5.2)
SODIUM SERPL-SCNC: 138 MMOL/L (ref 136–145)
WBC # BLD AUTO: 6.3 K/UL (ref 4–11)

## 2024-07-26 PROCEDURE — 85025 COMPLETE CBC W/AUTO DIFF WBC: CPT

## 2024-07-26 PROCEDURE — 6370000000 HC RX 637 (ALT 250 FOR IP): Performed by: INTERNAL MEDICINE

## 2024-07-26 PROCEDURE — 97116 GAIT TRAINING THERAPY: CPT

## 2024-07-26 PROCEDURE — G0378 HOSPITAL OBSERVATION PER HR: HCPCS

## 2024-07-26 PROCEDURE — 97530 THERAPEUTIC ACTIVITIES: CPT

## 2024-07-26 PROCEDURE — 97161 PT EVAL LOW COMPLEX 20 MIN: CPT

## 2024-07-26 PROCEDURE — 97165 OT EVAL LOW COMPLEX 30 MIN: CPT

## 2024-07-26 PROCEDURE — 36415 COLL VENOUS BLD VENIPUNCTURE: CPT

## 2024-07-26 PROCEDURE — 2580000003 HC RX 258: Performed by: INTERNAL MEDICINE

## 2024-07-26 PROCEDURE — 97535 SELF CARE MNGMENT TRAINING: CPT

## 2024-07-26 PROCEDURE — 93010 ELECTROCARDIOGRAM REPORT: CPT | Performed by: INTERNAL MEDICINE

## 2024-07-26 PROCEDURE — 80048 BASIC METABOLIC PNL TOTAL CA: CPT

## 2024-07-26 RX ADMIN — Medication 10 ML: at 10:23

## 2024-07-26 RX ADMIN — INSULIN LISPRO 10 UNITS: 100 INJECTION, SOLUTION INTRAVENOUS; SUBCUTANEOUS at 10:23

## 2024-07-26 RX ADMIN — DILTIAZEM HYDROCHLORIDE 180 MG: 180 CAPSULE, COATED, EXTENDED RELEASE ORAL at 10:23

## 2024-07-26 RX ADMIN — LEVETIRACETAM 500 MG: 500 TABLET, FILM COATED ORAL at 10:23

## 2024-07-26 NOTE — DISCHARGE SUMMARY
Hospital Medicine Discharge Summary    Patient: Mckenna Morin     Gender: female  : 1945   Age: 79 y.o.  MRN: [id number]    Admitting Physician: Nikkie Degroot MD  Discharge Physician: Nikkie Degroot MD     Code Status: Full Code     Admit Date: 2024   Discharge Date:   2024    Disposition:  Home  Time spent arranging discharge: 31 minutes    Discharge Diagnoses:    Active Hospital Problems    Diagnosis Date Noted    Syncope and collapse [R55] 2024     Condition at Discharge:  Stable    Hospital Course:   Patient mated to the with possible syncope and collapse versus mechanical falls patient echo and telemetry which was negative did not show any acute events  Patient was cleared for discharge home with home PT and OT per PT and OT discharge home with follow-up PCP as outpatient  Discharge Exam:    BP (!) 168/83   Pulse 100   Temp 97.4 °F (36.3 °C) (Oral)   Resp 16   Ht 1.702 m (5' 7\")   Wt 68 kg (150 lb)   SpO2 97%   BMI 23.49 kg/m²   General appearance:  Appears comfortable. AAOx3  HEENT: atraumatic, Pupils equal, muscous membranes moist, no masses appreciated  Cardiovascular: Regular rate and rhythm no murmurs appreciated  Respiratory: CTAB no wheezing  Gastrointestinal: Abdomen soft, non-tender, BS+  EXT: no edema  Neurology: no gross focal deficts  Psychiatry: Appropriate affect. Not agitated  Skin: Warm, dry, no rashes appreciated    Discharge Medications:   Current Discharge Medication List        Current Discharge Medication List        Current Discharge Medication List        CONTINUE these medications which have NOT CHANGED    Details   acetaminophen (TYLENOL) 325 MG tablet Take 2 tablets by mouth every 6 hours as needed for Pain      donepezil (ARICEPT) 5 MG tablet Take 1 tablet by mouth daily      carboxymethylcellulose 1 % ophthalmic solution Place 2 drops into both eyes 2 times daily      polycarbophil (FIBERCON) 625 MG tablet Take 1 tablet by mouth daily For

## 2024-07-26 NOTE — PLAN OF CARE
Problem: Safety - Adult  Goal: Free from fall injury  Outcome: Completed     Problem: Discharge Planning  Goal: Discharge to home or other facility with appropriate resources  Outcome: Completed     Problem: Pain  Goal: Verbalizes/displays adequate comfort level or baseline comfort level  Outcome: Completed     Problem: Chronic Conditions and Co-morbidities  Goal: Patient's chronic conditions and co-morbidity symptoms are monitored and maintained or improved  Outcome: Completed

## 2024-07-26 NOTE — PLAN OF CARE
Problem: Safety - Adult  Goal: Free from fall injury  7/25/2024 2234 by Jossie Mccarty RN  Outcome: Progressing     Problem: Discharge Planning  Goal: Discharge to home or other facility with appropriate resources  7/25/2024 2234 by Jossie Mccarty RN  Outcome: Progressing     Problem: Pain  Goal: Verbalizes/displays adequate comfort level or baseline comfort level  7/25/2024 2234 by Jossie Mccarty RN  Outcome: Progressing

## 2024-07-26 NOTE — PROGRESS NOTES
[facility] - Inpatient Rehabilitation Department   Phone: [phone]    Physical Therapy    [x] Initial Evaluation            [] Daily Treatment Note         [] Discharge Summary      Patient: Mckenna Morin   : 1945   MRN: [id number]   Date of Service:  2024  Admitting Diagnosis: Syncope and collapse  Current Admission Summary: Mckenna Morin is a 79 y.o. female who presents for fall.  Patient states that she has been having syncope recently.  Has been blacking out several times a week.  Patient states she just gets lightheaded and passes out.  States she has been taking care of her .  She states that today she was trying to get around her 's wheelchair she is unsure if she lost her balance or tripped or just passed out.  She remembers most of the fall.  But she does think that she lost consciousness.  Having pain in her left shoulder, left foot.  Currently denies any headache, chest pain, abdominal pain, nausea vomiting or diarrhea.  No dysuria or hematuria.  No fevers or chills.  No recent illness.  Patient has history of CAD, diabetes, hyperlipidemia, hypertension.  Patient has history of seizures but denies that this is similar to a seizure states she remembers the episode did not have a postictal period.  Past Medical History:  has a past medical history of Altered mental status, Anxiety, Arthropathy, CAD (coronary artery disease), Cellulitis, Diabetes mellitus (HCC), Fecal urgency, Hyperlipidemia, Hypertension, Hypo-osmolality and hyponatremia, Hypothyroid, Insomnia, Iron deficiency anemia, Major depressive disorder, Metabolic encephalopathy, Seizures (HCC), Unspecified dementia, unspecified severity, without behavioral disturbance, psychotic disturbance, mood disturbance, and anxiety ([facility]), and Vitamin B 12 deficiency.  Past Surgical History:  has a past surgical history that includes fracture surgery; joint replacement; Cholecystectomy; and  
How does patient ambulate?   []Low Fall Risk (ambulates by themselves without support)  []Stand by assist   []Contact Guard   [x]Front wheel walker  []Wheelchair   []Steady  []Bed bound  []History of Lower Extremity Amputation  []Unknown, did not assess in the emergency department   How does patient take pills?  []Whole with Water  []Crushed in applesauce  []Crushed in pudding  []Other  [x]Unknown no oral medications were given in the ED  Is patient alert?   [x]Alert  []Drowsy but responds to voice  []Doesn't respond to voice but responds to painful stimuli  []Unresponsive  Is patient oriented?   [x]To person  [x]To place  []To time  [x]To situation  []Confused  []Agitated  [x]Follows commands  If patient is disoriented or from a Skill Nursing Facility has family been notified of admission?   [x]Yes   []No  Patient belongings?   []Cell phone  []Wallet   []Dentures  [x]Clothing  Any specific patient or family belongings/needs/dynamics?   Patient has guardian. Patient fell and used bedpan while in ED   Miscellaneous comments/pending orders?    If there are any additional questions please reach out to the Emergency Department.        
Patient seen in ED, room 05.  Admission  completed based off paperwork from [facility] and per patient.  RN will need to complete the other required items in addition to the 4 Eyes Assessment, Immunizations, Vaccines, Rights and Responsibilities, orientation to room, Plan of Care, Education/Learning Assessment, Education Plan, white board, height and weight, pain assessment and head to toe assessment.  Patient is alert and oriented X 3 (dementia).  Patient is from [facility] and is being admitted for Syncope and collapse.   Home Medication Reconciliation has been modified to match the paperwork provided by [facility].      Fall risk bracelet applied.Patient has a difficult time hearing out of left ear.    Patient uses a walker to ambulate.   Per [facility] paperwork emergency contact 1 is listed a guardian but do not have any guardianship paperwork on file.  Patient is on regular diet and full code.   
Report called to nurse Martines receiving the patient. All questions and answered.   
Intervention: none - eval with same day discharge  Prognosis: good  Clinical Assessment: Patient appears to be close to baseline function. Has assist with all IADLs at baseline. No further OT indicated.   Safety Interventions: patient left in chair, chair alarm in place, call light within reach, and nurse notified    Plan  Frequency: Eval with same day discharge.  No follow up required.  Current Treatment Recommendations: not applicable, evaluation completed with same day discharge.    Goals  Patient Goals: to return home today    Short Term Goals:  Time Frame: n/a  Patient eval with same day discharge.  No goals set as patient is at baseline status.    Above goals reviewed on 7/26/2024.  All goals are ongoing at this time unless indicated above.       Therapy Session Time     Individual Group Co-treatment   Time In  0937     Time Out  1004     Minutes  27          Timed Code Treatment Minutes:   12  Total Treatment Minutes:  27       Electronically Signed By: Zach Angulo OT

## 2024-07-26 NOTE — DISCHARGE INSTR - COC
Fair    Condition at Discharge: Stable    Rehab Potential (if transferring to Rehab): Fair    Recommended Labs or Other Treatments After Discharge:     Physician Certification: I certify the above information and transfer of Mckenna Morin  is necessary for the continuing treatment of the diagnosis listed and that she requires Home Care for greater 30 days.     Update Admission H&P: No change in H&P    PHYSICIAN SIGNATURE:  Electronically signed by Nikkie Degroot MD on 7/26/24 at 12:54 PM EDT

## 2024-07-26 NOTE — CARE COORDINATION
Case Management -  Discharge Note      Patient Name: Mckenna Morin                   YOB: 1945            Readmission Risk (Low < 19, Mod (19-27), High > 27): No data recorded  Current PCP: Martinez Casey DO      PT AM-PAC: 20 /24  OT AM-PAC: 24 /24    Discharge Plan:  Patient discharge to:    [facility]  [address] [place] [address]  Phone: [phone]  Fax: [phone]  Nurse Report Line: [phone]     Count includes the [facility]  [address],   [place] [address]  Phone: [phone]  Fax: [phone]       SW faxed MAGALY and AVS to [phone]    AYANA Looney to call report to [phone]      CM spoke with [facility] admissions staff, Ciarra [phone] who reported she can set up transport for patient to go back to [facility] between 3:00pm and 4:00pm.    SW intervention complete.       Financial    Payor: [facility] MEDICARE / Plan: [facility] MEDICARE ADVANTAGE / Product Type: *No Product type* /     Pharmacy:  Potential assistance Purchasing Medications: No  Meds-to-Beds request: No      [facility] [place], [place] - [address] [facility] - P [phone] - F [phone]  [address] [id number]  Phone: [phone] Fax: [phone]      Notes:    Additional Case Management Notes: All CM needs met.        
Case Management Note:    Patient admitted as Observation with an anticipated short hospitalization length of stay. Chart reviewed and it appears that patient has minimal needs for discharge at this time. Medical staff to inform case management team if discharge needs are identified.      PT/OT evals still pending. Will await evals prior to discussing discharge plans with pt. Will follow up once therapy evals are complete and recommendations are rendered.     Case management will continue to follow progress and update discharge plan as needed.           Electronically signed by MIKHAIL Prasad on 7/25/2024 at 11:22 AM   
living (Patient lives at [facility] ([place]))  Primary Care Giver: Other (Comment) (Patient is not alert and oriented x4. CM spoke with [facility] Ciarra winston. Patient lives at [facility].)  Patient Support Systems include: Spouse/Significant Other, Family Members   Current Financial resources: Medicare  Current community resources: Assisted Living (Patient lives at [facility] ([place]))  Current services prior to admission: None            Current DME:              Type of Home Care services:  PT    ADLS  Prior functional level: Independent in ADLs/IADLs  Current functional level: Assistance with the following:, Other (see comment) (PT recommended Home Health Care (HHC).)    PT AM-PAC: 20 /24  OT AM-PAC: 24 /24    Family can provide assistance at DC: No  Would you like Case Management to discuss the discharge plan with any other family members/significant others, and if so, who? No  Plans to Return to Present Housing: Yes  Other Identified Issues/Barriers to RETURNING to current housing: N/A  Potential Assistance needed at discharge: N/A            Potential DME:    Patient expects to discharge to: Assisted living  Plan for transportation at discharge: Self    Financial    Payor: [facility] MEDICARE / Plan: [facility] MEDICARE ADVANTAGE / Product Type: *No Product type* /     Does insurance require precert for SNF: Yes    Potential assistance Purchasing Medications: No  Meds-to-Beds request: No      [facility] [place], [place] - [address] [facility] - P [phone] - F [phone]  [address] [facility] [address]  Phone: [phone] Fax: [phone]      Notes:    Factors facilitating achievement of predicted outcomes: Caregiver support, Motivated, Cooperative, and Pleasant    Barriers to discharge: N/A    Additional Case Management Notes: Patient is not alert and oriented x4. CM attempted to speak with

## 2025-04-01 ENCOUNTER — APPOINTMENT (OUTPATIENT)
Dept: GENERAL RADIOLOGY | Age: 80
End: 2025-04-01
Payer: MEDICAID

## 2025-04-01 ENCOUNTER — HOSPITAL ENCOUNTER (EMERGENCY)
Age: 80
Discharge: HOME | End: 2025-04-01
Attending: EMERGENCY MEDICINE
Payer: MEDICAID

## 2025-04-01 VITALS
RESPIRATION RATE: 20 BRPM | OXYGEN SATURATION: 99 % | HEIGHT: 68 IN | TEMPERATURE: 98.3 F | HEART RATE: 85 BPM | BODY MASS INDEX: 22.81 KG/M2 | SYSTOLIC BLOOD PRESSURE: 173 MMHG | DIASTOLIC BLOOD PRESSURE: 79 MMHG

## 2025-04-01 DIAGNOSIS — R07.9 CHEST PAIN, UNSPECIFIED TYPE: Primary | ICD-10-CM

## 2025-04-01 LAB
ALBUMIN SERPL-MCNC: 4.4 G/DL (ref 3.4–5)
ALBUMIN/GLOB SERPL: 1.5 {RATIO} (ref 1.1–2.2)
ALP SERPL-CCNC: 105 U/L (ref 40–129)
ALT SERPL-CCNC: 20 U/L (ref 10–40)
ANION GAP SERPL CALCULATED.3IONS-SCNC: 14 MMOL/L (ref 3–16)
AST SERPL-CCNC: 25 U/L (ref 15–37)
BASOPHILS # BLD: 0 K/UL (ref 0–0.2)
BASOPHILS NFR BLD: 0.4 %
BILIRUB SERPL-MCNC: 0.5 MG/DL (ref 0–1)
BUN SERPL-MCNC: 26 MG/DL (ref 7–20)
CALCIUM SERPL-MCNC: 9.8 MG/DL (ref 8.3–10.6)
CHLORIDE SERPL-SCNC: 95 MMOL/L (ref 99–110)
CO2 SERPL-SCNC: 21 MMOL/L (ref 21–32)
CREAT SERPL-MCNC: 1 MG/DL (ref 0.6–1.2)
DEPRECATED RDW RBC AUTO: 13.1 % (ref 12.4–15.4)
EOSINOPHIL # BLD: 0.1 K/UL (ref 0–0.6)
EOSINOPHIL NFR BLD: 1.4 %
GFR SERPLBLD CREATININE-BSD FMLA CKD-EPI: 57 ML/MIN/{1.73_M2}
GLUCOSE SERPL-MCNC: 325 MG/DL (ref 70–99)
HCT VFR BLD AUTO: 37.3 % (ref 36–48)
HGB BLD-MCNC: 13.2 G/DL (ref 12–16)
LYMPHOCYTES # BLD: 2.1 K/UL (ref 1–5.1)
LYMPHOCYTES NFR BLD: 22.6 %
MCH RBC QN AUTO: 33.5 PG (ref 26–34)
MCHC RBC AUTO-ENTMCNC: 35.4 G/DL (ref 31–36)
MCV RBC AUTO: 94.6 FL (ref 80–100)
MONOCYTES # BLD: 0.7 K/UL (ref 0–1.3)
MONOCYTES NFR BLD: 7.2 %
NEUTROPHILS # BLD: 6.2 K/UL (ref 1.7–7.7)
NEUTROPHILS NFR BLD: 68.4 %
NT-PROBNP SERPL-MCNC: 65 PG/ML (ref 0–449)
PLATELET # BLD AUTO: 145 K/UL (ref 135–450)
PMV BLD AUTO: 7.3 FL (ref 5–10.5)
POTASSIUM SERPL-SCNC: 5 MMOL/L (ref 3.5–5.1)
PROT SERPL-MCNC: 7.3 G/DL (ref 6.4–8.2)
RBC # BLD AUTO: 3.94 M/UL (ref 4–5.2)
SODIUM SERPL-SCNC: 130 MMOL/L (ref 136–145)
TROPONIN, HIGH SENSITIVITY: 13 NG/L (ref 0–14)
TROPONIN, HIGH SENSITIVITY: 14 NG/L (ref 0–14)
WBC # BLD AUTO: 9.1 K/UL (ref 4–11)

## 2025-04-01 PROCEDURE — 85025 COMPLETE CBC W/AUTO DIFF WBC: CPT

## 2025-04-01 PROCEDURE — 83880 ASSAY OF NATRIURETIC PEPTIDE: CPT

## 2025-04-01 PROCEDURE — 80053 COMPREHEN METABOLIC PANEL: CPT

## 2025-04-01 PROCEDURE — 36415 COLL VENOUS BLD VENIPUNCTURE: CPT

## 2025-04-01 PROCEDURE — 99285 EMERGENCY DEPT VISIT HI MDM: CPT

## 2025-04-01 PROCEDURE — 71045 X-RAY EXAM CHEST 1 VIEW: CPT

## 2025-04-01 PROCEDURE — 2580000003 HC RX 258: Performed by: EMERGENCY MEDICINE

## 2025-04-01 PROCEDURE — 93005 ELECTROCARDIOGRAM TRACING: CPT | Performed by: NURSE PRACTITIONER

## 2025-04-01 PROCEDURE — 84484 ASSAY OF TROPONIN QUANT: CPT

## 2025-04-01 RX ORDER — PAROXETINE 10 MG/1
10 TABLET, FILM COATED ORAL EVERY MORNING
COMMUNITY
Start: 2025-03-31

## 2025-04-01 RX ORDER — 0.9 % SODIUM CHLORIDE 0.9 %
500 INTRAVENOUS SOLUTION INTRAVENOUS ONCE
Status: COMPLETED | OUTPATIENT
Start: 2025-04-01 | End: 2025-04-01

## 2025-04-01 RX ADMIN — SODIUM CHLORIDE 500 ML: 9 INJECTION, SOLUTION INTRAVENOUS at 20:52

## 2025-04-01 ASSESSMENT — ENCOUNTER SYMPTOMS
ABDOMINAL PAIN: 0
VOMITING: 0
SHORTNESS OF BREATH: 0
CHEST TIGHTNESS: 0
DIARRHEA: 0
NAUSEA: 0
COUGH: 1

## 2025-04-01 ASSESSMENT — PAIN SCALES - GENERAL: PAINLEVEL_OUTOF10: 4

## 2025-04-01 ASSESSMENT — PAIN - FUNCTIONAL ASSESSMENT: PAIN_FUNCTIONAL_ASSESSMENT: 0-10

## 2025-04-01 ASSESSMENT — PAIN DESCRIPTION - LOCATION: LOCATION: CHEST

## 2025-04-01 ASSESSMENT — HEART SCORE: ECG: NORMAL

## 2025-04-01 ASSESSMENT — PAIN DESCRIPTION - DESCRIPTORS: DESCRIPTORS: POUNDING

## 2025-04-01 ASSESSMENT — PAIN DESCRIPTION - ORIENTATION: ORIENTATION: MID

## 2025-04-01 ASSESSMENT — LIFESTYLE VARIABLES
HOW OFTEN DO YOU HAVE A DRINK CONTAINING ALCOHOL: NEVER
HOW MANY STANDARD DRINKS CONTAINING ALCOHOL DO YOU HAVE ON A TYPICAL DAY: PATIENT DOES NOT DRINK

## 2025-04-02 LAB
EKG ATRIAL RATE: 84 BPM
EKG DIAGNOSIS: NORMAL
EKG P AXIS: 41 DEGREES
EKG P-R INTERVAL: 162 MS
EKG Q-T INTERVAL: 368 MS
EKG QRS DURATION: 74 MS
EKG QTC CALCULATION (BAZETT): 434 MS
EKG R AXIS: -15 DEGREES
EKG T AXIS: 29 DEGREES
EKG VENTRICULAR RATE: 84 BPM

## 2025-04-02 PROCEDURE — 93010 ELECTROCARDIOGRAM REPORT: CPT | Performed by: INTERNAL MEDICINE

## 2025-04-02 NOTE — ED PROVIDER NOTES
I have personally performed a face to face diagnostic evaluation on this patient. I have fully participated in the care of this patient I personally saw the patient and performed a substantive portion of the visit including all aspects of the medical decision making.  I have reviewed and agree with all pertinent clinical information including history, physical exam, diagnostic tests, and the plan.      HPI: Mckenna Morin presented with single episode of chest pain that occurred while she was eating dinner tonight.  Felt more like heavy palpitations that lasted just a few seconds.  Patient states that the symptoms went away.  She came in via EMS.  Currently asymptomatic no shortness of breath no recent illness.  No nausea vomiting or diarrhea.  Was given aspirin and route.  Chief Complaint   Patient presents with    Chest Pain     Pt arrives from [facility] via EMS complaining of chestpain x 1 hr.  324mg asa given in squad.  Pt denies cardiac hx.      Review of Systems: See RICO note  Vital Signs: BP (!) 148/66   Pulse 82   Temp 98.3 °F (36.8 °C) (Oral)   Resp 17   Ht 1.727 m (5' 8\")   SpO2 95%   BMI 22.81 kg/m²     Alert 79 y.o. female who does not appear toxic or acutely ill  HENT: Atraumatic, oral mucosa moist  Neck: Grossly normal ROM  Chest/Lungs: respiratory effort normal   Abdomen: Soft nontender  Musculoskeletal: Grossly normal ROM  Skin: No palor or diaphoresis    Medical Decision Making and Plan:  Pertinent Labs & Imaging studies reviewed. (See RICO chart for details)  I agree with RICO assessment and plan.  79-year-old female had single episode of chest pain with palpitations that lasted a few seconds currently not having any symptoms afebrile not tachycardic saturating well on room air hypertensive.  Does have risk factor diabetes hyperlipidemia hypertension.  Patient has not had any recent stress test but patient is completely asymptomatic 2 troponins are negative patient's EKG is completely 
Source and Summary) fall, closed head injury admission 7/25/2020    CC/HPI Summary, DDx, ED Course, and Reassessment:     Briefly, this is a 79 year old female who presents to the ER with constant mid sternal chest pain that has been ongoing for about an hour.  No mitigating or exacerbating factors.  Reports that pain is \"thumping\" in nature.  No sob, but has been coughing.    324 mg Aspirin was given per EMS     Troponin 14, repeat troponin 13.  CBC is unremarkable.  CMP does show glucose of 325, patient is a diabetic.  BNP 65.    Chest x-ray shows no acute process.    Patient very much does want to be discharged home today.  She does have a heart score of 4.  We did discuss outpatient follow-up and strict return precaution.  The patient is chest pain-free.    MEDICAL DECISION MAKING:   I considered, but did not perform, additional testing such CT Angiogram, as well as admission or transfer to a higher level of care.     I utilized an evidence-based risk rating tool (CMT) along with my training and experience to weigh the risk of discharge against the risks of further testing, imaging, or hospitalization. At this time, I estimate the risks of additional testing, imaging, or hospitalization to be equal to or greater than the risk of discharge(in the case of discharge home).      The patient's HEART Score is 4. In rare cases, I give patients with HEART Score of 4 the option of discharge, but only when they meet criteria for \"Low 4,\" meaning that HST was used, and the 4 is not from a highly suspicious story, highly suspicious EKG, or positive cardiac enzymes.  In these selected cases, the risk of a \"Low 4\" is still most likely lower than the risk of admission and further testing/imaging. [id number]    SHARED DECISION MAKING:   I discussed my risk assessment with the patient. The patient understands and consents to the risk of disposition/plan, as well as the risk of uncertainty in estimating outcomes.

## 2025-04-11 ENCOUNTER — HOSPITAL ENCOUNTER (EMERGENCY)
Age: 80
Discharge: HOME | End: 2025-04-11
Attending: EMERGENCY MEDICINE
Payer: MEDICARE

## 2025-04-11 ENCOUNTER — APPOINTMENT (OUTPATIENT)
Dept: GENERAL RADIOLOGY | Age: 80
End: 2025-04-11
Payer: MEDICARE

## 2025-04-11 VITALS
HEIGHT: 68 IN | BODY MASS INDEX: 24.25 KG/M2 | RESPIRATION RATE: 21 BRPM | TEMPERATURE: 98.4 F | SYSTOLIC BLOOD PRESSURE: 113 MMHG | DIASTOLIC BLOOD PRESSURE: 91 MMHG | OXYGEN SATURATION: 96 % | WEIGHT: 160 LBS | HEART RATE: 78 BPM

## 2025-04-11 DIAGNOSIS — R07.9 CHEST PAIN, UNSPECIFIED TYPE: Primary | ICD-10-CM

## 2025-04-11 LAB
ALBUMIN SERPL-MCNC: 4.3 G/DL (ref 3.4–5)
ALBUMIN/GLOB SERPL: 1.5 {RATIO} (ref 1.1–2.2)
ALP SERPL-CCNC: 105 U/L (ref 40–129)
ALT SERPL-CCNC: 19 U/L (ref 10–40)
ANION GAP SERPL CALCULATED.3IONS-SCNC: 12 MMOL/L (ref 3–16)
AST SERPL-CCNC: 22 U/L (ref 15–37)
BASOPHILS # BLD: 0 K/UL (ref 0–0.2)
BASOPHILS NFR BLD: 0.3 %
BILIRUB SERPL-MCNC: 0.4 MG/DL (ref 0–1)
BUN SERPL-MCNC: 15 MG/DL (ref 7–20)
CALCIUM SERPL-MCNC: 9.7 MG/DL (ref 8.3–10.6)
CHLORIDE SERPL-SCNC: 97 MMOL/L (ref 99–110)
CO2 SERPL-SCNC: 23 MMOL/L (ref 21–32)
CREAT SERPL-MCNC: 0.9 MG/DL (ref 0.6–1.2)
DEPRECATED RDW RBC AUTO: 13.1 % (ref 12.4–15.4)
EOSINOPHIL # BLD: 0.1 K/UL (ref 0–0.6)
EOSINOPHIL NFR BLD: 0.8 %
GFR SERPLBLD CREATININE-BSD FMLA CKD-EPI: 65 ML/MIN/{1.73_M2}
GLUCOSE SERPL-MCNC: 278 MG/DL (ref 70–99)
HCT VFR BLD AUTO: 36.2 % (ref 36–48)
HGB BLD-MCNC: 12.6 G/DL (ref 12–16)
LYMPHOCYTES # BLD: 2.1 K/UL (ref 1–5.1)
LYMPHOCYTES NFR BLD: 22.4 %
MCH RBC QN AUTO: 33.3 PG (ref 26–34)
MCHC RBC AUTO-ENTMCNC: 34.9 G/DL (ref 31–36)
MCV RBC AUTO: 95.4 FL (ref 80–100)
MONOCYTES # BLD: 0.7 K/UL (ref 0–1.3)
MONOCYTES NFR BLD: 6.9 %
NEUTROPHILS # BLD: 6.6 K/UL (ref 1.7–7.7)
NEUTROPHILS NFR BLD: 69.6 %
NT-PROBNP SERPL-MCNC: 185 PG/ML (ref 0–449)
PLATELET # BLD AUTO: 149 K/UL (ref 135–450)
PMV BLD AUTO: 6.5 FL (ref 5–10.5)
POTASSIUM SERPL-SCNC: 4.4 MMOL/L (ref 3.5–5.1)
PROT SERPL-MCNC: 7.1 G/DL (ref 6.4–8.2)
RBC # BLD AUTO: 3.79 M/UL (ref 4–5.2)
SODIUM SERPL-SCNC: 132 MMOL/L (ref 136–145)
TROPONIN, HIGH SENSITIVITY: 10 NG/L (ref 0–14)
TROPONIN, HIGH SENSITIVITY: 12 NG/L (ref 0–14)
WBC # BLD AUTO: 9.4 K/UL (ref 4–11)

## 2025-04-11 PROCEDURE — 93005 ELECTROCARDIOGRAM TRACING: CPT | Performed by: EMERGENCY MEDICINE

## 2025-04-11 PROCEDURE — 6370000000 HC RX 637 (ALT 250 FOR IP): Performed by: NURSE PRACTITIONER

## 2025-04-11 PROCEDURE — 71045 X-RAY EXAM CHEST 1 VIEW: CPT

## 2025-04-11 PROCEDURE — 85025 COMPLETE CBC W/AUTO DIFF WBC: CPT

## 2025-04-11 PROCEDURE — 84484 ASSAY OF TROPONIN QUANT: CPT

## 2025-04-11 PROCEDURE — 80053 COMPREHEN METABOLIC PANEL: CPT

## 2025-04-11 PROCEDURE — 83880 ASSAY OF NATRIURETIC PEPTIDE: CPT

## 2025-04-11 PROCEDURE — 99285 EMERGENCY DEPT VISIT HI MDM: CPT

## 2025-04-11 RX ADMIN — LIDOCAINE HYDROCHLORIDE: 20 SOLUTION ORAL at 18:47

## 2025-04-11 ASSESSMENT — PAIN - FUNCTIONAL ASSESSMENT: PAIN_FUNCTIONAL_ASSESSMENT: 0-10

## 2025-04-11 NOTE — ED PROVIDER NOTES
[facility] EMERGENCY DEPARTMENT  EMERGENCY DEPARTMENT ENCOUNTER        Pt Name: Mckenna Morin  MRN: [id number]  Birthdate 1945  Date of evaluation: 2025  Provider: ROMERO Barry - SANDI  PCP: Martinez Casey DO  Note Started: 7:17 PM EDT 25       I have seen and evaluated this patient with my supervising physician Tracey Gill,*.      CHIEF COMPLAINT       Chief Complaint   Patient presents with    Chest Pain     Pt in from [facility], chest pain was recently here for same, can't remember what they told her. Gave 324mg ASA        HISTORY OF PRESENT ILLNESS: 1 or more Elements     History From: ems and patient  Limitations to history : None    Mckenna Morin is a 79 y.o. female who presents to the emergency department from [facility] with complaint of left-sided chest pain.  Patient is uncertain if she ate dinner tonight, has some memory issues at baseline.  EMS did give the patient 324 mg of aspirin prior to arrival.  Patient is unable to describe pain, just rubs the left side of her chest.    Nursing Notes were all reviewed and agreed with or any disagreements were addressed in the HPI.    REVIEW OF SYSTEMS :      Review of Systems   Unable to perform ROS: Dementia   Cardiovascular:  Positive for chest pain.       Positives and Pertinent negatives as per HPI.     SURGICAL HISTORY     Past Surgical History:   Procedure Laterality Date     SECTION      CHOLECYSTECTOMY      FRACTURE SURGERY      ankle    JOINT REPLACEMENT      patient is unsure       CURRENTMEDICATIONS       Previous Medications    ACETAMINOPHEN (TYLENOL) 325 MG TABLET    Take 2 tablets by mouth every 6 hours as needed for Pain    ALBUTEROL SULFATE HFA (PROVENTIL;VENTOLIN;PROAIR) 108 (90 BASE) MCG/ACT INHALER    Inhale 1 puff into the lungs every 6 hours as needed    ATORVASTATIN (LIPITOR) 20 MG TABLET    Take 1 tablet by mouth at bedtime    CARBOXYMETHYLCELLULOSE 1 % OPHTHALMIC SOLUTION

## 2025-04-12 LAB
EKG ATRIAL RATE: 74 BPM
EKG DIAGNOSIS: NORMAL
EKG P AXIS: 26 DEGREES
EKG P-R INTERVAL: 158 MS
EKG Q-T INTERVAL: 376 MS
EKG QRS DURATION: 76 MS
EKG QTC CALCULATION (BAZETT): 417 MS
EKG R AXIS: -24 DEGREES
EKG T AXIS: 12 DEGREES
EKG VENTRICULAR RATE: 74 BPM

## 2025-04-12 PROCEDURE — 93010 ELECTROCARDIOGRAM REPORT: CPT | Performed by: INTERNAL MEDICINE

## 2025-04-12 ASSESSMENT — HEART SCORE: ECG: NORMAL

## 2025-04-12 NOTE — ED PROVIDER NOTES
[facility] Emergency Department      Pt Name: Mckenna Morin  MRN: [id number]  Birthdate 1945  Date of evaluation: 2025  Provider: SILVER BAUTISTA MD  I personally saw Mckenna Morin and made and approved the management plan with the advanced practice provider.  I take responsibility for the patient management.     HPI: Mckenna Morin presented with   Chief Complaint   Patient presents with    Chest Pain     Pt in from [facility], chest pain was recently here for same, can't remember what they told her. Gave 324mg ASA      Mckenna Morin has a past medical history of Altered mental status, Anxiety, Arthropathy, CAD (coronary artery disease), Cellulitis, Diabetes mellitus (HCC), Fecal urgency, Hyperlipidemia, Hypertension, Hypo-osmolality and hyponatremia, Hypothyroid, Insomnia, Iron deficiency anemia, Major depressive disorder, Metabolic encephalopathy, Seizures (HCC), Unspecified dementia, unspecified severity, without behavioral disturbance, psychotic disturbance, mood disturbance, and anxiety (HCC), and Vitamin B 12 deficiency.  She has a past surgical history that includes fracture surgery; joint replacement; Cholecystectomy; and  section.    No current facility-administered medications on file prior to encounter.     Current Outpatient Medications on File Prior to Encounter   Medication Sig Dispense Refill    PARoxetine (PAXIL) 10 MG tablet Take 1 tablet by mouth every morning      acetaminophen (TYLENOL) 325 MG tablet Take 2 tablets by mouth every 6 hours as needed for Pain      donepezil (ARICEPT) 5 MG tablet Take 1 tablet by mouth daily      carboxymethylcellulose 1 % ophthalmic solution Place 2 drops into both eyes 2 times daily      polycarbophil (FIBERCON) 625 MG tablet Take 1 tablet by mouth daily For fecal urgency      dicyclomine (BENTYL) 10 MG capsule Take 1 capsule by mouth 3 times daily (before meals)      ferrous sulfate (IRON 325) 325 (65 Fe) MG tablet Take 1 tablet by mouth

## 2025-04-12 NOTE — ED NOTES
Report given to [facility] ems and provided with all papers for discharge. Denies any questions at this time.

## 2025-05-19 ENCOUNTER — APPOINTMENT (OUTPATIENT)
Dept: GENERAL RADIOLOGY | Age: 80
DRG: 313 | End: 2025-05-19
Payer: MEDICARE

## 2025-05-19 ENCOUNTER — HOSPITAL ENCOUNTER (INPATIENT)
Age: 80
LOS: 1 days | Discharge: HOME | DRG: 313 | End: 2025-05-22
Attending: STUDENT IN AN ORGANIZED HEALTH CARE EDUCATION/TRAINING PROGRAM | Admitting: INTERNAL MEDICINE
Payer: MEDICARE

## 2025-05-19 DIAGNOSIS — R07.9 CHEST PAIN, UNSPECIFIED TYPE: Primary | ICD-10-CM

## 2025-05-19 LAB
ALBUMIN SERPL-MCNC: 3.7 G/DL (ref 3.4–5)
ALBUMIN/GLOB SERPL: 1.5 {RATIO} (ref 1.1–2.2)
ALP SERPL-CCNC: 77 U/L (ref 40–129)
ALT SERPL-CCNC: 16 U/L (ref 10–40)
ANION GAP SERPL CALCULATED.3IONS-SCNC: 15 MMOL/L (ref 3–16)
AST SERPL-CCNC: 22 U/L (ref 15–37)
BASOPHILS # BLD: 0 K/UL (ref 0–0.2)
BASOPHILS NFR BLD: 0.4 %
BILIRUB SERPL-MCNC: 0.4 MG/DL (ref 0–1)
BILIRUB UR QL STRIP.AUTO: NEGATIVE
BUN SERPL-MCNC: 18 MG/DL (ref 7–20)
CALCIUM SERPL-MCNC: 8.6 MG/DL (ref 8.3–10.6)
CHLORIDE SERPL-SCNC: 99 MMOL/L (ref 99–110)
CLARITY UR: CLEAR
CO2 SERPL-SCNC: 19 MMOL/L (ref 21–32)
COLOR UR: YELLOW
CREAT SERPL-MCNC: 0.8 MG/DL (ref 0.6–1.2)
DEPRECATED RDW RBC AUTO: 12.9 % (ref 12.4–15.4)
EOSINOPHIL # BLD: 0.2 K/UL (ref 0–0.6)
EOSINOPHIL NFR BLD: 1.8 %
FLUAV RNA RESP QL NAA+PROBE: NOT DETECTED
FLUBV RNA RESP QL NAA+PROBE: NOT DETECTED
GFR SERPLBLD CREATININE-BSD FMLA CKD-EPI: 74 ML/MIN/{1.73_M2}
GLUCOSE SERPL-MCNC: 134 MG/DL (ref 70–99)
GLUCOSE UR STRIP.AUTO-MCNC: >=1000 MG/DL
HCT VFR BLD AUTO: 33.2 % (ref 36–48)
HGB BLD-MCNC: 12 G/DL (ref 12–16)
HGB UR QL STRIP.AUTO: NEGATIVE
KETONES UR STRIP.AUTO-MCNC: NEGATIVE MG/DL
LACTATE BLDV-SCNC: 2.7 MMOL/L (ref 0.4–1.9)
LEUKOCYTE ESTERASE UR QL STRIP.AUTO: NEGATIVE
LIPASE SERPL-CCNC: 21 U/L (ref 13–60)
LYMPHOCYTES # BLD: 2.5 K/UL (ref 1–5.1)
LYMPHOCYTES NFR BLD: 29.4 %
MCH RBC QN AUTO: 33.7 PG (ref 26–34)
MCHC RBC AUTO-ENTMCNC: 36.3 G/DL (ref 31–36)
MCV RBC AUTO: 92.8 FL (ref 80–100)
MONOCYTES # BLD: 0.7 K/UL (ref 0–1.3)
MONOCYTES NFR BLD: 7.9 %
NEUTROPHILS # BLD: 5.1 K/UL (ref 1.7–7.7)
NEUTROPHILS NFR BLD: 60.5 %
NITRITE UR QL STRIP.AUTO: NEGATIVE
NT-PROBNP SERPL-MCNC: 247 PG/ML (ref 0–449)
PH UR STRIP.AUTO: 5.5 [PH] (ref 5–8)
PLATELET # BLD AUTO: 129 K/UL (ref 135–450)
PMV BLD AUTO: 6.7 FL (ref 5–10.5)
POTASSIUM SERPL-SCNC: 4 MMOL/L (ref 3.5–5.1)
PROT SERPL-MCNC: 6.1 G/DL (ref 6.4–8.2)
PROT UR STRIP.AUTO-MCNC: NEGATIVE MG/DL
RBC # BLD AUTO: 3.57 M/UL (ref 4–5.2)
SARS-COV-2 RNA RESP QL NAA+PROBE: NOT DETECTED
SODIUM SERPL-SCNC: 133 MMOL/L (ref 136–145)
SP GR UR STRIP.AUTO: 1.01 (ref 1–1.03)
TROPONIN, HIGH SENSITIVITY: 12 NG/L (ref 0–14)
TROPONIN, HIGH SENSITIVITY: 12 NG/L (ref 0–14)
UA COMPLETE W REFLEX CULTURE PNL UR: ABNORMAL
UA DIPSTICK W REFLEX MICRO PNL UR: ABNORMAL
URN SPEC COLLECT METH UR: ABNORMAL
UROBILINOGEN UR STRIP-ACNC: 0.2 E.U./DL
WBC # BLD AUTO: 8.5 K/UL (ref 4–11)

## 2025-05-19 PROCEDURE — 71046 X-RAY EXAM CHEST 2 VIEWS: CPT

## 2025-05-19 PROCEDURE — 87636 SARSCOV2 & INF A&B AMP PRB: CPT

## 2025-05-19 PROCEDURE — 83690 ASSAY OF LIPASE: CPT

## 2025-05-19 PROCEDURE — 81003 URINALYSIS AUTO W/O SCOPE: CPT

## 2025-05-19 PROCEDURE — 83880 ASSAY OF NATRIURETIC PEPTIDE: CPT

## 2025-05-19 PROCEDURE — 84484 ASSAY OF TROPONIN QUANT: CPT

## 2025-05-19 PROCEDURE — 80053 COMPREHEN METABOLIC PANEL: CPT

## 2025-05-19 PROCEDURE — 93005 ELECTROCARDIOGRAM TRACING: CPT | Performed by: STUDENT IN AN ORGANIZED HEALTH CARE EDUCATION/TRAINING PROGRAM

## 2025-05-19 PROCEDURE — 99285 EMERGENCY DEPT VISIT HI MDM: CPT

## 2025-05-19 PROCEDURE — 83605 ASSAY OF LACTIC ACID: CPT

## 2025-05-19 PROCEDURE — 85025 COMPLETE CBC W/AUTO DIFF WBC: CPT

## 2025-05-19 RX ORDER — METOPROLOL SUCCINATE 25 MG/1
25 TABLET, EXTENDED RELEASE ORAL NIGHTLY
Status: ON HOLD | COMMUNITY
End: 2025-05-22

## 2025-05-19 RX ORDER — ERGOCALCIFEROL 1.25 MG/1
50000 CAPSULE ORAL WEEKLY
COMMUNITY

## 2025-05-19 ASSESSMENT — LIFESTYLE VARIABLES
HOW MANY STANDARD DRINKS CONTAINING ALCOHOL DO YOU HAVE ON A TYPICAL DAY: PATIENT DOES NOT DRINK
HOW OFTEN DO YOU HAVE A DRINK CONTAINING ALCOHOL: NEVER

## 2025-05-19 ASSESSMENT — PAIN SCALES - GENERAL: PAINLEVEL_OUTOF10: 8

## 2025-05-19 ASSESSMENT — HEART SCORE: ECG: NORMAL

## 2025-05-19 ASSESSMENT — PAIN DESCRIPTION - LOCATION: LOCATION: CHEST

## 2025-05-19 ASSESSMENT — PAIN - FUNCTIONAL ASSESSMENT: PAIN_FUNCTIONAL_ASSESSMENT: 0-10

## 2025-05-19 NOTE — PROGRESS NOTES
Pharmacy Home Medication Reconciliation Note    A medication reconciliation has been completed for Mckenna Morin 1945    Pharmacy: [facility] [address], [place], [place]  Information provided by: facility    The patient's home medication list is as follows:  No current facility-administered medications on file prior to encounter.     Current Outpatient Medications on File Prior to Encounter   Medication Sig Dispense Refill    ergocalciferol (ERGOCALCIFEROL) 1.25 MG (70658 UT) capsule Take 1 capsule by mouth once a week Thursdays.      metoprolol succinate (TOPROL XL) 25 MG extended release tablet Take 1 tablet by mouth nightly      donepezil (ARICEPT) 5 MG tablet Take 1 tablet by mouth daily      carboxymethylcellulose 1 % ophthalmic solution Place 2 drops into both eyes 2 times daily      polycarbophil (FIBERCON) 625 MG tablet Take 1 tablet by mouth 2 times daily For fecal urgency      dicyclomine (BENTYL) 10 MG capsule Take 1 capsule by mouth 3 times daily (before meals)      ferrous sulfate (IRON 325) 325 (65 Fe) MG tablet Take 1 tablet by mouth daily (with breakfast)      JARDIANCE 10 MG tablet Take 1 tablet by mouth daily      melatonin 3 MG TABS tablet Take 1 tablet by mouth nightly      metFORMIN, MOD, (GLUMETZA) 1000 MG extended release tablet Take 1 tablet by mouth 2 times daily (with meals)      atorvastatin (LIPITOR) 20 MG tablet Take 1 tablet by mouth at bedtime      cyanocobalamin 1000 MCG/ML injection Inject 1 mL into the muscle every 14 days Every other Saturday      insulin glargine (LANTUS) 100 UNIT/ML injection vial Inject 25 Units into the skin nightly      insulin lispro (HUMALOG) 100 UNIT/ML SOLN injection vial Inject 2 Units into the skin 2 times daily Breakfast and lunch only      levETIRAcetam (KEPPRA) 500 MG tablet Take 1 tablet by mouth 2 times daily      traZODone (DESYREL) 50 MG tablet Take 1 tablet by mouth nightly      PARoxetine (PAXIL) 10 MG tablet Take 1

## 2025-05-20 ENCOUNTER — APPOINTMENT (OUTPATIENT)
Age: 80
DRG: 313 | End: 2025-05-20
Attending: INTERNAL MEDICINE
Payer: MEDICARE

## 2025-05-20 ENCOUNTER — HOSPITAL ENCOUNTER (OUTPATIENT)
Age: 80
Setting detail: OBSERVATION
Discharge: HOME | DRG: 313 | End: 2025-05-22
Attending: INTERNAL MEDICINE
Payer: MEDICARE

## 2025-05-20 PROBLEM — R07.9 CHEST PAIN: Status: ACTIVE | Noted: 2025-05-20

## 2025-05-20 LAB
CHOLEST SERPL-MCNC: 147 MG/DL (ref 0–199)
D-DIMER QUANTITATIVE: <0.27 UG/ML FEU (ref 0–0.6)
ECHO AO ASC DIAM: 3.1 CM
ECHO AO ASCENDING AORTA INDEX: 1.72 CM/M2
ECHO AV AREA PEAK VELOCITY: 1.5 CM2
ECHO AV AREA VTI: 1.5 CM2
ECHO AV AREA/BSA PEAK VELOCITY: 0.8 CM2/M2
ECHO AV AREA/BSA VTI: 0.8 CM2/M2
ECHO AV CUSP MM: 1 CM
ECHO AV MEAN GRADIENT: 11 MMHG
ECHO AV MEAN VELOCITY: 1.6 M/S
ECHO AV PEAK GRADIENT: 18 MMHG
ECHO AV PEAK VELOCITY: 2.1 M/S
ECHO AV VELOCITY RATIO: 0.48
ECHO AV VTI: 48.9 CM
ECHO BSA: 1.8 M2
ECHO BSA: 1.8 M2
ECHO EST RA PRESSURE: 3 MMHG
ECHO LA AREA 2C: 18.3 CM2
ECHO LA AREA 4C: 12.6 CM2
ECHO LA DIAMETER INDEX: 2 CM/M2
ECHO LA DIAMETER: 3.6 CM
ECHO LA MAJOR AXIS: 5.1 CM
ECHO LA MINOR AXIS: 5.3 CM
ECHO LA VOL BP: 37 ML (ref 22–52)
ECHO LA VOL MOD A2C: 52 ML (ref 22–52)
ECHO LA VOL MOD A4C: 25 ML (ref 22–52)
ECHO LA VOL/BSA BIPLANE: 21 ML/M2 (ref 16–34)
ECHO LA VOLUME INDEX MOD A2C: 29 ML/M2 (ref 16–34)
ECHO LA VOLUME INDEX MOD A4C: 14 ML/M2 (ref 16–34)
ECHO LV E' LATERAL VELOCITY: 3.05 CM/S
ECHO LV E' SEPTAL VELOCITY: 3.05 CM/S
ECHO LV EDV A2C: 57 ML
ECHO LV EDV A4C: 73 ML
ECHO LV EDV INDEX A4C: 41 ML/M2
ECHO LV EDV NDEX A2C: 32 ML/M2
ECHO LV EF PHYSICIAN: 63 %
ECHO LV EJECTION FRACTION A2C: 62 %
ECHO LV EJECTION FRACTION A4C: 61 %
ECHO LV EJECTION FRACTION BIPLANE: 62 % (ref 55–100)
ECHO LV ESV A2C: 22 ML
ECHO LV ESV A4C: 28 ML
ECHO LV ESV INDEX A2C: 12 ML/M2
ECHO LV ESV INDEX A4C: 16 ML/M2
ECHO LV FRACTIONAL SHORTENING: 33 % (ref 28–44)
ECHO LV INTERNAL DIMENSION DIASTOLE INDEX: 1.83 CM/M2
ECHO LV INTERNAL DIMENSION DIASTOLIC: 3.3 CM (ref 3.9–5.3)
ECHO LV INTERNAL DIMENSION SYSTOLIC INDEX: 1.22 CM/M2
ECHO LV INTERNAL DIMENSION SYSTOLIC: 2.2 CM
ECHO LV IVSD: 1.1 CM (ref 0.6–0.9)
ECHO LV MASS 2D: 101.7 G (ref 67–162)
ECHO LV MASS INDEX 2D: 56.5 G/M2 (ref 43–95)
ECHO LV POSTERIOR WALL DIASTOLIC: 1 CM (ref 0.6–0.9)
ECHO LV RELATIVE WALL THICKNESS RATIO: 0.61
ECHO LVOT AREA: 2.5 CM2
ECHO LVOT AV VTI INDEX: 0.42
ECHO LVOT DIAM: 1.8 CM
ECHO LVOT MEAN GRADIENT: 2 MMHG
ECHO LVOT PEAK GRADIENT: 4 MMHG
ECHO LVOT PEAK VELOCITY: 1 M/S
ECHO LVOT STROKE VOLUME INDEX: 28.7 ML/M2
ECHO LVOT SV: 51.6 ML
ECHO LVOT VTI: 20.3 CM
ECHO MV A VELOCITY: 1.07 M/S
ECHO MV E VELOCITY: 0.59 M/S
ECHO MV E/A RATIO: 0.55
ECHO MV E/E' LATERAL: 19.34
ECHO MV E/E' RATIO (AVERAGED): 19.34
ECHO MV E/E' SEPTAL: 19.34
ECHO PV MAX VELOCITY: 0.9 M/S
ECHO PV PEAK GRADIENT: 3 MMHG
ECHO RA AREA 4C: 9 CM2
ECHO RA END SYSTOLIC VOLUME APICAL 4 CHAMBER INDEX BSA: 9 ML/M2
ECHO RA VOLUME: 16 ML
ECHO RV BASAL DIMENSION: 2.6 CM
ECHO RV FREE WALL PEAK S': 10.4 CM/S
ECHO RV LONGITUDINAL DIMENSION: 6 CM
ECHO RV MID DIMENSION: 1.5 CM
ECHO RV TAPSE: 1.7 CM (ref 1.7–?)
EKG ATRIAL RATE: 76 BPM
EKG DIAGNOSIS: NORMAL
EKG P AXIS: 37 DEGREES
EKG P-R INTERVAL: 156 MS
EKG Q-T INTERVAL: 386 MS
EKG QRS DURATION: 80 MS
EKG QTC CALCULATION (BAZETT): 434 MS
EKG R AXIS: -27 DEGREES
EKG T AXIS: 17 DEGREES
EKG VENTRICULAR RATE: 76 BPM
EST. AVERAGE GLUCOSE BLD GHB EST-MCNC: 171.4 MG/DL
GLUCOSE BLD-MCNC: 174 MG/DL (ref 70–99)
GLUCOSE BLD-MCNC: 229 MG/DL (ref 70–99)
GLUCOSE BLD-MCNC: 244 MG/DL (ref 70–99)
HBA1C MFR BLD: 7.6 %
HDLC SERPL-MCNC: 50 MG/DL (ref 40–60)
LDLC SERPL CALC-MCNC: 73 MG/DL
NUC STRESS EJECTION FRACTION: 61 %
NUC STRESS LV EDV: 57 ML (ref 56–104)
NUC STRESS LV ESV: 22 ML (ref 19–49)
NUC STRESS LV MASS: 101 G
PERFORMED ON: ABNORMAL
PROCALCITONIN SERPL IA-MCNC: 0.06 NG/ML (ref 0–0.15)
STRESS BASELINE DIAS BP: 81 MMHG
STRESS BASELINE HR: 77 BPM
STRESS BASELINE SYS BP: 149 MMHG
STRESS ESTIMATED WORKLOAD: 1 METS
STRESS EXERCISE DUR MIN: 4 MIN
STRESS EXERCISE DUR SEC: 0 SEC
STRESS O2 SAT PEAK: 98 %
STRESS O2 SAT REST: 96 %
STRESS PEAK DIAS BP: 54 MMHG
STRESS PEAK SYS BP: 133 MMHG
STRESS PERCENT HR ACHIEVED: 76 %
STRESS POST PEAK HR: 107 BPM
STRESS RATE PRESSURE PRODUCT: NORMAL BPM*MMHG
STRESS TARGET HR: 141 BPM
TID: 1.09
TRIGL SERPL-MCNC: 119 MG/DL (ref 0–150)
TROPONIN, HIGH SENSITIVITY: 16 NG/L (ref 0–14)
VLDLC SERPL CALC-MCNC: 24 MG/DL

## 2025-05-20 PROCEDURE — 6370000000 HC RX 637 (ALT 250 FOR IP): Performed by: NURSE PRACTITIONER

## 2025-05-20 PROCEDURE — 0202U NFCT DS 22 TRGT SARS-COV-2: CPT

## 2025-05-20 PROCEDURE — 93018 CV STRESS TEST I&R ONLY: CPT | Performed by: INTERNAL MEDICINE

## 2025-05-20 PROCEDURE — 2500000003 HC RX 250 WO HCPCS: Performed by: INTERNAL MEDICINE

## 2025-05-20 PROCEDURE — 87040 BLOOD CULTURE FOR BACTERIA: CPT

## 2025-05-20 PROCEDURE — 93010 ELECTROCARDIOGRAM REPORT: CPT | Performed by: INTERNAL MEDICINE

## 2025-05-20 PROCEDURE — 93017 CV STRESS TEST TRACING ONLY: CPT

## 2025-05-20 PROCEDURE — 99223 1ST HOSP IP/OBS HIGH 75: CPT | Performed by: INTERNAL MEDICINE

## 2025-05-20 PROCEDURE — 6360000002 HC RX W HCPCS: Performed by: INTERNAL MEDICINE

## 2025-05-20 PROCEDURE — A9502 TC99M TETROFOSMIN: HCPCS | Performed by: INTERNAL MEDICINE

## 2025-05-20 PROCEDURE — 83036 HEMOGLOBIN GLYCOSYLATED A1C: CPT

## 2025-05-20 PROCEDURE — G0378 HOSPITAL OBSERVATION PER HR: HCPCS

## 2025-05-20 PROCEDURE — 84145 PROCALCITONIN (PCT): CPT

## 2025-05-20 PROCEDURE — 96374 THER/PROPH/DIAG INJ IV PUSH: CPT

## 2025-05-20 PROCEDURE — 36415 COLL VENOUS BLD VENIPUNCTURE: CPT

## 2025-05-20 PROCEDURE — 6360000002 HC RX W HCPCS

## 2025-05-20 PROCEDURE — 94760 N-INVAS EAR/PLS OXIMETRY 1: CPT

## 2025-05-20 PROCEDURE — 85379 FIBRIN DEGRADATION QUANT: CPT

## 2025-05-20 PROCEDURE — 93016 CV STRESS TEST SUPVJ ONLY: CPT | Performed by: INTERNAL MEDICINE

## 2025-05-20 PROCEDURE — 78452 HT MUSCLE IMAGE SPECT MULT: CPT | Performed by: INTERNAL MEDICINE

## 2025-05-20 PROCEDURE — 93306 TTE W/DOPPLER COMPLETE: CPT | Performed by: INTERNAL MEDICINE

## 2025-05-20 PROCEDURE — 3430000000 HC RX DIAGNOSTIC RADIOPHARMACEUTICAL: Performed by: INTERNAL MEDICINE

## 2025-05-20 PROCEDURE — 80061 LIPID PANEL: CPT

## 2025-05-20 PROCEDURE — 96372 THER/PROPH/DIAG INJ SC/IM: CPT

## 2025-05-20 PROCEDURE — 78452 HT MUSCLE IMAGE SPECT MULT: CPT

## 2025-05-20 PROCEDURE — 6370000000 HC RX 637 (ALT 250 FOR IP): Performed by: INTERNAL MEDICINE

## 2025-05-20 PROCEDURE — 93306 TTE W/DOPPLER COMPLETE: CPT

## 2025-05-20 PROCEDURE — 84484 ASSAY OF TROPONIN QUANT: CPT

## 2025-05-20 RX ORDER — PAROXETINE 20 MG/1
10 TABLET, FILM COATED ORAL EVERY MORNING
Status: DISCONTINUED | OUTPATIENT
Start: 2025-05-20 | End: 2025-05-22 | Stop reason: HOSPADM

## 2025-05-20 RX ORDER — POTASSIUM CHLORIDE 1500 MG/1
40 TABLET, EXTENDED RELEASE ORAL PRN
Status: DISCONTINUED | OUTPATIENT
Start: 2025-05-20 | End: 2025-05-22 | Stop reason: HOSPADM

## 2025-05-20 RX ORDER — METOPROLOL SUCCINATE 25 MG/1
25 TABLET, EXTENDED RELEASE ORAL NIGHTLY
Status: DISCONTINUED | OUTPATIENT
Start: 2025-05-20 | End: 2025-05-21

## 2025-05-20 RX ORDER — ACETAMINOPHEN 650 MG/1
650 SUPPOSITORY RECTAL EVERY 6 HOURS PRN
Status: DISCONTINUED | OUTPATIENT
Start: 2025-05-20 | End: 2025-05-22 | Stop reason: HOSPADM

## 2025-05-20 RX ORDER — ONDANSETRON 2 MG/ML
4 INJECTION INTRAMUSCULAR; INTRAVENOUS EVERY 6 HOURS PRN
Status: DISCONTINUED | OUTPATIENT
Start: 2025-05-20 | End: 2025-05-22 | Stop reason: HOSPADM

## 2025-05-20 RX ORDER — GLUCAGON 1 MG/ML
1 KIT INJECTION PRN
Status: DISCONTINUED | OUTPATIENT
Start: 2025-05-20 | End: 2025-05-22 | Stop reason: HOSPADM

## 2025-05-20 RX ORDER — TRAZODONE HYDROCHLORIDE 50 MG/1
50 TABLET ORAL NIGHTLY
Status: DISCONTINUED | OUTPATIENT
Start: 2025-05-20 | End: 2025-05-22 | Stop reason: HOSPADM

## 2025-05-20 RX ORDER — LISINOPRIL 20 MG/1
40 TABLET ORAL DAILY
Status: DISCONTINUED | OUTPATIENT
Start: 2025-05-20 | End: 2025-05-22 | Stop reason: HOSPADM

## 2025-05-20 RX ORDER — ACETAMINOPHEN 325 MG/1
650 TABLET ORAL EVERY 6 HOURS PRN
Status: DISCONTINUED | OUTPATIENT
Start: 2025-05-20 | End: 2025-05-22 | Stop reason: HOSPADM

## 2025-05-20 RX ORDER — REGADENOSON 0.08 MG/ML
0.4 INJECTION, SOLUTION INTRAVENOUS
Status: COMPLETED | OUTPATIENT
Start: 2025-05-20 | End: 2025-05-20

## 2025-05-20 RX ORDER — SODIUM CHLORIDE 0.9 % (FLUSH) 0.9 %
5-40 SYRINGE (ML) INJECTION PRN
Status: DISCONTINUED | OUTPATIENT
Start: 2025-05-20 | End: 2025-05-22 | Stop reason: HOSPADM

## 2025-05-20 RX ORDER — DEXTROSE MONOHYDRATE 100 MG/ML
INJECTION, SOLUTION INTRAVENOUS CONTINUOUS PRN
Status: DISCONTINUED | OUTPATIENT
Start: 2025-05-20 | End: 2025-05-22 | Stop reason: HOSPADM

## 2025-05-20 RX ORDER — ASPIRIN 81 MG/1
81 TABLET, CHEWABLE ORAL DAILY
Status: DISCONTINUED | OUTPATIENT
Start: 2025-05-21 | End: 2025-05-22 | Stop reason: HOSPADM

## 2025-05-20 RX ORDER — INSULIN LISPRO 100 [IU]/ML
0-8 INJECTION, SOLUTION INTRAVENOUS; SUBCUTANEOUS
Status: DISCONTINUED | OUTPATIENT
Start: 2025-05-20 | End: 2025-05-22 | Stop reason: HOSPADM

## 2025-05-20 RX ORDER — DONEPEZIL HYDROCHLORIDE 5 MG/1
5 TABLET, FILM COATED ORAL DAILY
Status: DISCONTINUED | OUTPATIENT
Start: 2025-05-20 | End: 2025-05-22 | Stop reason: HOSPADM

## 2025-05-20 RX ORDER — ENOXAPARIN SODIUM 100 MG/ML
40 INJECTION SUBCUTANEOUS DAILY
Status: DISCONTINUED | OUTPATIENT
Start: 2025-05-20 | End: 2025-05-22 | Stop reason: HOSPADM

## 2025-05-20 RX ORDER — DICYCLOMINE HYDROCHLORIDE 10 MG/1
10 CAPSULE ORAL
Status: DISCONTINUED | OUTPATIENT
Start: 2025-05-20 | End: 2025-05-22 | Stop reason: HOSPADM

## 2025-05-20 RX ORDER — SODIUM CHLORIDE 0.9 % (FLUSH) 0.9 %
5-40 SYRINGE (ML) INJECTION EVERY 12 HOURS SCHEDULED
Status: DISCONTINUED | OUTPATIENT
Start: 2025-05-20 | End: 2025-05-22 | Stop reason: HOSPADM

## 2025-05-20 RX ORDER — INSULIN GLARGINE 100 [IU]/ML
25 INJECTION, SOLUTION SUBCUTANEOUS NIGHTLY
Status: DISCONTINUED | OUTPATIENT
Start: 2025-05-20 | End: 2025-05-22 | Stop reason: HOSPADM

## 2025-05-20 RX ORDER — ATORVASTATIN CALCIUM 20 MG/1
20 TABLET, FILM COATED ORAL NIGHTLY
Status: DISCONTINUED | OUTPATIENT
Start: 2025-05-20 | End: 2025-05-22 | Stop reason: HOSPADM

## 2025-05-20 RX ORDER — MAGNESIUM SULFATE IN WATER 40 MG/ML
2000 INJECTION, SOLUTION INTRAVENOUS PRN
Status: DISCONTINUED | OUTPATIENT
Start: 2025-05-20 | End: 2025-05-22 | Stop reason: HOSPADM

## 2025-05-20 RX ORDER — POTASSIUM CHLORIDE 7.45 MG/ML
10 INJECTION INTRAVENOUS PRN
Status: DISCONTINUED | OUTPATIENT
Start: 2025-05-20 | End: 2025-05-22 | Stop reason: HOSPADM

## 2025-05-20 RX ORDER — FERROUS SULFATE 325(65) MG
325 TABLET ORAL
Status: DISCONTINUED | OUTPATIENT
Start: 2025-05-20 | End: 2025-05-22 | Stop reason: HOSPADM

## 2025-05-20 RX ORDER — ONDANSETRON 4 MG/1
4 TABLET, ORALLY DISINTEGRATING ORAL EVERY 8 HOURS PRN
Status: DISCONTINUED | OUTPATIENT
Start: 2025-05-20 | End: 2025-05-22 | Stop reason: HOSPADM

## 2025-05-20 RX ORDER — POLYETHYLENE GLYCOL 3350 17 G/17G
17 POWDER, FOR SOLUTION ORAL DAILY PRN
Status: DISCONTINUED | OUTPATIENT
Start: 2025-05-20 | End: 2025-05-22 | Stop reason: HOSPADM

## 2025-05-20 RX ORDER — SODIUM CHLORIDE 9 MG/ML
INJECTION, SOLUTION INTRAVENOUS PRN
Status: DISCONTINUED | OUTPATIENT
Start: 2025-05-20 | End: 2025-05-22 | Stop reason: HOSPADM

## 2025-05-20 RX ORDER — NIFEDIPINE 30 MG
30 TABLET, EXTENDED RELEASE ORAL DAILY
Status: CANCELLED | OUTPATIENT
Start: 2025-05-20

## 2025-05-20 RX ORDER — HYDRALAZINE HYDROCHLORIDE 20 MG/ML
10 INJECTION INTRAMUSCULAR; INTRAVENOUS ONCE
Status: COMPLETED | OUTPATIENT
Start: 2025-05-20 | End: 2025-05-20

## 2025-05-20 RX ORDER — LEVETIRACETAM 500 MG/1
500 TABLET ORAL 2 TIMES DAILY
Status: DISCONTINUED | OUTPATIENT
Start: 2025-05-20 | End: 2025-05-22 | Stop reason: HOSPADM

## 2025-05-20 RX ADMIN — SODIUM CHLORIDE, PRESERVATIVE FREE 10 ML: 5 INJECTION INTRAVENOUS at 09:02

## 2025-05-20 RX ADMIN — DICYCLOMINE HYDROCHLORIDE 10 MG: 10 CAPSULE ORAL at 11:45

## 2025-05-20 RX ADMIN — SODIUM CHLORIDE, PRESERVATIVE FREE 10 ML: 5 INJECTION INTRAVENOUS at 20:07

## 2025-05-20 RX ADMIN — PAROXETINE HYDROCHLORIDE 10 MG: 20 TABLET, FILM COATED ORAL at 08:59

## 2025-05-20 RX ADMIN — ENOXAPARIN SODIUM 40 MG: 100 INJECTION SUBCUTANEOUS at 08:59

## 2025-05-20 RX ADMIN — HYDRALAZINE HYDROCHLORIDE 10 MG: 20 INJECTION INTRAMUSCULAR; INTRAVENOUS at 22:19

## 2025-05-20 RX ADMIN — METOPROLOL SUCCINATE 25 MG: 25 TABLET, EXTENDED RELEASE ORAL at 20:07

## 2025-05-20 RX ADMIN — METOPROLOL SUCCINATE 25 MG: 25 TABLET, EXTENDED RELEASE ORAL at 02:52

## 2025-05-20 RX ADMIN — DICYCLOMINE HYDROCHLORIDE 10 MG: 10 CAPSULE ORAL at 06:03

## 2025-05-20 RX ADMIN — DONEPEZIL HYDROCHLORIDE 5 MG: 5 TABLET ORAL at 08:59

## 2025-05-20 RX ADMIN — MELATONIN TAB 3 MG 3 MG: 3 TAB at 20:11

## 2025-05-20 RX ADMIN — INSULIN GLARGINE 25 UNITS: 100 INJECTION, SOLUTION SUBCUTANEOUS at 20:06

## 2025-05-20 RX ADMIN — INSULIN LISPRO 2 UNITS: 100 INJECTION, SOLUTION INTRAVENOUS; SUBCUTANEOUS at 17:40

## 2025-05-20 RX ADMIN — LISINOPRIL 40 MG: 20 TABLET ORAL at 02:52

## 2025-05-20 RX ADMIN — ATORVASTATIN CALCIUM 20 MG: 20 TABLET, FILM COATED ORAL at 02:52

## 2025-05-20 RX ADMIN — INSULIN LISPRO 2 UNITS: 100 INJECTION, SOLUTION INTRAVENOUS; SUBCUTANEOUS at 20:06

## 2025-05-20 RX ADMIN — LEVETIRACETAM 500 MG: 500 TABLET, FILM COATED ORAL at 02:52

## 2025-05-20 RX ADMIN — DICYCLOMINE HYDROCHLORIDE 10 MG: 10 CAPSULE ORAL at 17:40

## 2025-05-20 RX ADMIN — ATORVASTATIN CALCIUM 20 MG: 20 TABLET, FILM COATED ORAL at 20:07

## 2025-05-20 RX ADMIN — LEVETIRACETAM 500 MG: 500 TABLET, FILM COATED ORAL at 20:06

## 2025-05-20 RX ADMIN — Medication 325 MG: at 08:59

## 2025-05-20 RX ADMIN — TRAZODONE HYDROCHLORIDE 50 MG: 50 TABLET ORAL at 20:07

## 2025-05-20 RX ADMIN — TETROFOSMIN 32.7 MILLICURIE: 1.38 INJECTION, POWDER, LYOPHILIZED, FOR SOLUTION INTRAVENOUS at 14:05

## 2025-05-20 RX ADMIN — LEVETIRACETAM 500 MG: 500 TABLET, FILM COATED ORAL at 08:59

## 2025-05-20 RX ADMIN — TETROFOSMIN 10.2 MILLICURIE: 1.38 INJECTION, POWDER, LYOPHILIZED, FOR SOLUTION INTRAVENOUS at 13:18

## 2025-05-20 RX ADMIN — REGADENOSON 0.4 MG: 0.08 INJECTION, SOLUTION INTRAVENOUS at 13:59

## 2025-05-20 ASSESSMENT — PAIN SCALES - GENERAL
PAINLEVEL_OUTOF10: 8
PAINLEVEL_OUTOF10: 5

## 2025-05-20 ASSESSMENT — PAIN DESCRIPTION - LOCATION: LOCATION: CHEST

## 2025-05-20 ASSESSMENT — ENCOUNTER SYMPTOMS
SHORTNESS OF BREATH: 0
COUGH: 0
RHINORRHEA: 0
NAUSEA: 0
ABDOMINAL PAIN: 0
VOMITING: 0
DIARRHEA: 0

## 2025-05-20 ASSESSMENT — PAIN DESCRIPTION - FREQUENCY: FREQUENCY: INTERMITTENT

## 2025-05-20 ASSESSMENT — PAIN DESCRIPTION - ONSET: ONSET: ON-GOING

## 2025-05-20 ASSESSMENT — PAIN DESCRIPTION - PAIN TYPE: TYPE: ACUTE PAIN

## 2025-05-20 ASSESSMENT — PAIN DESCRIPTION - DESCRIPTORS: DESCRIPTORS: THROBBING

## 2025-05-20 ASSESSMENT — PAIN DESCRIPTION - ORIENTATION: ORIENTATION: MID

## 2025-05-20 NOTE — ED NOTES
Patient Name: Mckenna Morin  : 1945 79 y.o.  MRN: [id number]  ED Room #: ED-0016/16     Chief complaint:   Chief Complaint   Patient presents with    Chest Pain     Pt brought in by [facility] from [facility] c/o chest pain. Pt was given nitro, zofran, and 4 baby aspirin en route. Pt febrile at triage, 100.1F     Hospital Problem/Diagnosis:   Hospital Problems           Last Modified POA    * (Principal) Chest pain 2025 Yes         O2 Flow Rate:O2 Device: None (Room air)   (if applicable)  Cardiac Rhythm:   (if applicable)  Active LDA's:   Peripheral IV 25 Right Antecubital (Active)            How does patient ambulate? Contact Guard    2. How does patient take pills? Whole with Water    3. Is patient alert? Alert    4. Is patient oriented? To Person, To Place, To Time, To Situation, and Follows Commands    5.   Patient arrived from:  nursing home  Facility Name: ___________________________________________    6. If patient is disoriented or from a Skill Nursing Facility has family been notified of admission? No    7. Patient belongings? Belongings: Clothing    Disposition of belongings? Kept with Patient     8. Any specific patient or family belongings/needs/dynamics?   a.     9. Miscellaneous comments/pending orders?  a.      If there are any additional questions please reach out to the Emergency Department.

## 2025-05-20 NOTE — ED PROVIDER NOTES
[facility] emergency department  EMERGENCY DEPARTMENT ENCOUNTER        Pt Name: Mckenna Morin  MRN: [id number]  Birthdate 1945  Date of evaluation: 5/19/2025  Provider: Laurie Santizo PA-C  PCP: Martinez Casey DO  Note Started: 3:49 AM EDT 5/20/25       I have seen and evaluated this patient with my supervising physician Bashir Garrido MD.      CHIEF COMPLAINT       Chief Complaint   Patient presents with    Chest Pain     Pt brought in by [facility] from [facility] c/o chest pain. Pt was given nitro, zofran, and 4 baby aspirin en route. Pt febrile at triage, 100.1F       HISTORY OF PRESENT ILLNESS: 1 or more Elements     History From: Patient  Limitations to history : None    Mckenna Morin is a 79 y.o. female who presents to the emergency department today for evaluation for concerns of chest pain.  The patient has a history of coronary artery disease hypertension, hyperlipidemia, patient is from nursing home facility, states that she was sitting, watching TV when she states that she developed a pain and pressure-like sensation to the left side of her chest.  Patient reports that she was nauseous, and felt short of breath.  Prior to arrival she was given nitro, Zofran as well as aspirin and will I evaluate her she states she is otherwise asymptomatic.  Patient does have a history of dementia and is otherwise at her baseline.  She has not any fevers.  No cough.  No vomiting.  No other history is able to be obtained    Nursing Notes were all reviewed and agreed with or any disagreements were addressed in the HPI.    REVIEW OF SYSTEMS :      Review of Systems   Constitutional:  Negative for activity change, appetite change, chills and fever.   HENT:  Negative for congestion and rhinorrhea.    Respiratory:  Negative for cough and shortness of breath.    Cardiovascular:  Positive for chest pain.   Gastrointestinal:  Negative for abdominal pain, diarrhea, nausea and vomiting.  (has no administration in time range)     Or   potassium bicarb-citric acid (EFFER-K) effervescent tablet 40 mEq (has no administration in time range)     Or   potassium chloride 10 mEq/100 mL IVPB (Peripheral Line) (has no administration in time range)   magnesium sulfate 2000 mg in 50 mL IVPB premix (has no administration in time range)   ondansetron (ZOFRAN-ODT) disintegrating tablet 4 mg (has no administration in time range)     Or   ondansetron (ZOFRAN) injection 4 mg (has no administration in time range)   acetaminophen (TYLENOL) tablet 650 mg (has no administration in time range)     Or   acetaminophen (TYLENOL) suppository 650 mg (has no administration in time range)   polyethylene glycol (GLYCOLAX) packet 17 g (has no administration in time range)   aspirin chewable tablet 81 mg (has no administration in time range)   sulfur hexafluoride microspheres (LUMASON) 60.7-25 MG injection 2 mL (has no administration in time range)   regadenoson (LEXISCAN) injection 0.4 mg (has no administration in time range)   enoxaparin (LOVENOX) injection 40 mg (has no administration in time range)       ED Course as of 05/20/25 0349   Mon May 19, 2025   1854 Received aspirin en route by EMS as well as nitro which has a minimal improvement in her pain [AL]   2001 Platelet Count(!): 129  Slightly worse than previous baseline [AL]      ED Course User Index  [AL] Paul Garrido MD        Chronic Conditions affecting care:    has a past medical history of Altered mental status, Anxiety, Arthropathy, CAD (coronary artery disease), Cellulitis, Diabetes mellitus (HCC), Fecal urgency, Hyperlipidemia, Hypertension, Hypo-osmolality and hyponatremia, Hypothyroid, Insomnia, Iron deficiency anemia, Major depressive disorder, Metabolic encephalopathy, Seizures (HCC), Unspecified dementia, unspecified severity, without behavioral disturbance, psychotic disturbance, mood disturbance, and anxiety (HCC), and Vitamin B 12

## 2025-05-20 NOTE — CONSULTS
[facility]  Cardiology Note  [phone]      Chief Complaint   Patient presents with    Chest Pain     Pt brought in by [facility] from [facility] c/o chest pain. Pt was given nitro, zofran, and 4 baby aspirin en route. Pt febrile at triage, 100.1F        History of Present Illness:  Mckenna Morin is a 79 y.o. patient PMHx CAD, HTN, HLD, dementia who presented to the hospital with complaints of chest pain    Patient reports acute onset chest pressure at rest while watching TV. She is sedentary but denies symptoms with minimal levels of exertion. She presented with chest pain at rest in early April and was offered stress testing and admission in the ED but preferred D/C. It does not appear that she followed up with cardiology or PCP in that time. She presented again in mid April with similar symptoms and was discharged.     Past Medical History:   has a past medical history of Altered mental status, Anxiety, Arthropathy, CAD (coronary artery disease), Cellulitis, Diabetes mellitus (HCC), Fecal urgency, Hyperlipidemia, Hypertension, Hypo-osmolality and hyponatremia, Hypothyroid, Insomnia, Iron deficiency anemia, Major depressive disorder, Metabolic encephalopathy, Seizures (HCC), Unspecified dementia, unspecified severity, without behavioral disturbance, psychotic disturbance, mood disturbance, and anxiety (HCC), and Vitamin B 12 deficiency.    Surgical History:   has a past surgical history that includes fracture surgery; joint replacement; Cholecystectomy; and  section.     Social History:   reports that she has never smoked. She has never used smokeless tobacco. She reports that she does not drink alcohol and does not use drugs.     Family History:  Family History   Problem Relation Age of Onset    Diabetes Mother     No Known Problems Brother        Home Medications:  Were reviewed and are listed in nursing record. and/or listed below  Prior to Admission medications    Medication Sig

## 2025-05-20 NOTE — DISCHARGE SUMMARY
[facility]    Patient Demographics    Patient. Mckenna Morin  Date of Birth. 1945  MRN. [id number]     Primary care provider. Martinez Casey DO  (Tel: [phone])    Admit date: 5/19/2025    Discharge date (blank if same as Note Date):   Note Date: 5/22/2025     Reason for Hospitalization.   Chief Complaint   Patient presents with    Chest Pain     Pt brought in by [facility] from [facility] c/o chest pain. Pt was given nitro, zofran, and 4 baby aspirin en route. Pt febrile at triage, 100.1F       Significant Findings.   Principal Problem:    Chest pain  Resolved Problems:    * No resolved hospital problems. *     Problem-based Hospital Course.  Mckenna Morin is a 79 y.o. female with a past medical history of hypertension, hyperlipidemia, DM2, CAD, hypothyroidism, metabolic encephalopathy, seizures, dementia who presented with chest pain from [facility]. Trop negative x 2.  EKG without acute ST changes.  D-dimer <0.27, wells score 0. Stress test no ischemia, echo with no acute findings normal EF.     Assessment and Plan:  Fever              - T 100.1 on admission              - Admits to intermitent cough, denies dysuria, no further CP              - CXR was nonacute              - Procal 0.06              - Urinalysis concerning for possible UTI   - Urine culture showed normal winston               - STOP IV ceftriaxone              - No recurrent fever              - Respiratory panel neg               - No leukocytosis              - Blood cultures x 2 NGTD  Chest Pain  CAD              - No further CP or SOB              - Stress test no ischemia              - Echo 60-65% GIIDD, mild to moderate AS, small pericardial effusion without tamponade              - D-dimer <0.27               - Cardiology consulted- Echo and stress test, discussed with cardiology after testing and was reviewed with no further workup planned and OK with discharge       [place], [facility] [address]      Phone: [phone]   metoprolol succinate 25 MG extended release tablet       Information about where to get these medications is not yet available    Ask your nurse or doctor about these medications  lisinopril 40 MG tablet       Spent 35 minutes in discharge process.    Signed:  ROMERO Marquez CNP     5/22/2025 1:00 PM

## 2025-05-20 NOTE — ED NOTES
Report called to nurse receiving patient in room 3313, Andrez. All questions answered at this time

## 2025-05-20 NOTE — ED PROVIDER NOTES
ED Attending Attestation Note    I personally saw the patient and made/approved the management plan and take responsibility for patient management.        Briefly, 79 y.o. female presents with substernal chest pain into the left side of her chest.  Nonexertional.  Manage no associate nausea vomiting diaphoresis.  No fevers chills or coughs..       Focused exam:   Physical Exam  Vitals and nursing note reviewed.   Constitutional:       General: She is in acute distress.      Appearance: She is ill-appearing. She is not toxic-appearing or diaphoretic.   HENT:      Head: Normocephalic and atraumatic.      Right Ear: External ear normal.      Left Ear: External ear normal.      Nose: Nose normal.   Eyes:      Conjunctiva/sclera: Conjunctivae normal.   Cardiovascular:      Rate and Rhythm: Normal rate and regular rhythm.      Heart sounds: Normal heart sounds. No murmur heard.     No gallop.   Pulmonary:      Effort: Pulmonary effort is normal. No respiratory distress.      Breath sounds: No stridor. No wheezing, rhonchi or rales.   Chest:      Chest wall: No tenderness.   Musculoskeletal:         General: Normal range of motion.      Right lower leg: No edema.      Left lower leg: No edema.   Skin:     General: Skin is warm and dry.      Capillary Refill: Capillary refill takes less than 2 seconds.   Neurological:      Mental Status: She is alert.           Imaging:   XR CHEST (2 VW)   Final Result   No evidence of acute cardiopulmonary process.                  I independently interpreted the following studies:   ECG: Sinus rhythm ventricular 76.  CT interval 156, QRS 80 QTc 434, physiological LAD no clinically significant ST segment ovation or depression, no significant T wave normality other than T wave inversion in lead III      External Documentation Reviewed: Previous patient encounter documents & history available on EMR was reviewed:   Record from: Patient was seen on 4/11/2025 in the ED for chest pain.

## 2025-05-20 NOTE — PROGRESS NOTES
Hospitalist Progress Note      Name:  Mckenna Morin /Age/Sex: 1945  (79 y.o. female)   MRN & CSN:  [id number] & [id number] Encounter Date/Time: 2025 9:17 AM EDT   Location:  [facility]3313/3313-01 PCP: Martinez Casey DO     Attending:Sreekanth Rivero MD       Hospital Day: 2    Subjective:   Chief Complaint:   Chief Complaint   Patient presents with    Chest Pain     Pt brought in by [facility] from [facility] c/o chest pain. Pt was given nitro, zofran, and 4 baby aspirin en route. Pt febrile at triage, 100.1F     Mckenna Morin is a 79 y.o. female with a past medical history of hypertension, hyperlipidemia, DM2, CAD, hypothyroidism, metabolic encephalopathy, seizures, dementia who presented with chest pain from [facility]. Trop negative x 2.  EKG without acute ST changes.      Interval History:  Today, she is resting in bed.  Oriented to person, some of situation, disoriented to time.      Independently reviewed interval ancillary notes from emergency medicine.     Assessment and Recommendations   Problem List  Principal Problem:    Chest pain  Resolved Problems:    * No resolved hospital problems. *     Assessment and Plan:    Fever   - Tmax 100.4   - Admits to cough, denies dysuria, no further CP   - CXR was nonacute   - Add procal   - Add urinalysis   - Trend fever curve   - No leukocytosis   - Blood cultures x 2    Chest Pain  CAD   - No further CP or SOB              - Stress test no ischemia              - Echo 60-65% GIIDD, mild to moderate AS, small pericardial effusion without tamponade              - D-dimer <0.27               - Cardiology consulted- Echo and stress test, discussed with cardiology after testing and was reviewed with no further workup planned and OK with discharge               - Continue ASA, statin, BB   Hypertension              - Uncontrolled              - Resume lisinopril 40 mg daily, att Toprol 25 mg daily per cards recs   DM2  - A1C pending  - On Lantus

## 2025-05-20 NOTE — H&P
Hospital Medicine History & Physical      Date of Admission: 5/19/2025    Date of Service:  Pt seen/examined on 05/20/25     []Admitted to Inpatient with expected LOS greater than two midnights due to medical therapy.  []Placed in Observation status.    Chief Admission Complaint: Chest pain    Presenting Admission History:      79 y.o. female with PMHx significant for coronary artery disease, essential hypertension, hyperlipidemia, type 2 diabetes mellitus, seizure disorder, depression who presented to ED with a complaint of chest pain.  Patient started having chest pain earlier today which was described as pressure-like feeling like someone standing on the patient's chest.  Patient denies any radiation.  Patient did have some associated shortness of breath, diaphoresis, nausea and lightheadedness.  In ED labs were remarkable for troponin which were negative x 2 and an EKG that showed no evidence of ischemic changes.  Patient currently reports significant improvement of her chest pain.  Patient is currently assigned observation status for close monitoring and further evaluation.      ROS:     Review of 10 systems is negative except what is outlined in HPI.     Assessment:  Chest pain/angina.  Coronary artery disease.  Essential hypertension, uncontrolled.  Hyperlipidemia.  Type 2 diabetes mellitus.  Seizure disorder.  Chronic depression.  Generalized anxiety disorder    Plan:    Patient is currently assigned to observation status.  Troponins are negative x 2 and no acute ischemic changes on EKG.  Continue aspirin and as needed nitroglycerin.  Check lipid panel in the morning.  Keep n.p.o. after midnight.  Plan to proceed with stress test in AM.  Obtain echocardiogram.  Consider cardiology consult depending on the clinical course.  Resume the rest of home medications as appropriate  SQ Lovenox for DVT prophylaxis    Physical Exam Performed:      BP (!) 146/108   Pulse 74   Temp 100.1 °F (37.8 °C) (Oral)   Resp

## 2025-05-20 NOTE — PROGRESS NOTES
4 Eyes Skin Assessment     NAME:  Mckenna Morin  YOB: 1945  MEDICAL RECORD NUMBER:  [id number]    The patient is being assessed for  Admission    I agree that at least one RN has performed a thorough Head to Toe Skin Assessment on the patient. ALL assessment sites listed below have been assessed.      Areas assessed by both nurses:    Head, Face, Ears, Shoulders, Back, Chest, Arms, Elbows, Hands, Sacrum. Buttock, Coccyx, Ischium, Legs. Feet and Heels, and Under Medical Devices         Does the Patient have a Wound? No noted wound(s)    Redness groin / santos area  Scattered abrasions        Rajat Prevention initiated by RN: Yes  Wound Care Orders initiated by RN: No    Pressure Injury (Stage 3,4, Unstageable, DTI, NWPT, and Complex wounds) if present, place Wound referral order by RN under : No    New Ostomies, if present place, Ostomy referral order under : No     Nurse 1 eSignature: Electronically signed by Andrez Miramontes RN on 5/20/25 at 3:50 AM EDT    **SHARE this note so that the co-signing nurse can place an eSignature**    Nurse 2 eSignature: Electronically signed by Kamille Terrazas RN on 5/20/25 at 4:05 AM EDT

## 2025-05-20 NOTE — PLAN OF CARE
Problem: Chronic Conditions and Co-morbidities  Goal: Patient's chronic conditions and co-morbidity symptoms are monitored and maintained or improved  Outcome: Progressing     Problem: Discharge Planning  Goal: Discharge to home or other facility with appropriate resources  Outcome: Progressing  Flowsheets (Taken 5/20/2025 0138)  Discharge to home or other facility with appropriate resources: Identify barriers to discharge with patient and caregiver     Problem: Pain  Goal: Verbalizes/displays adequate comfort level or baseline comfort level  Outcome: Progressing     Problem: Safety - Adult  Goal: Free from fall injury  Outcome: Progressing     Problem: Neurosensory - Adult  Goal: Achieves maximal functionality and self care  Outcome: Progressing     Problem: Respiratory - Adult  Goal: Achieves optimal ventilation and oxygenation  Outcome: Progressing     Problem: Cardiovascular - Adult  Goal: Maintains optimal cardiac output and hemodynamic stability  Outcome: Progressing  Goal: Absence of cardiac dysrhythmias or at baseline  Outcome: Progressing     Problem: Skin/Tissue Integrity - Adult  Goal: Skin integrity remains intact  Outcome: Progressing     Problem: Musculoskeletal - Adult  Goal: Return mobility to safest level of function  Outcome: Progressing  Goal: Return ADL status to a safe level of function  Outcome: Progressing     Problem: Gastrointestinal - Adult  Goal: Maintains adequate nutritional intake  Outcome: Progressing     Problem: Metabolic/Fluid and Electrolytes - Adult  Goal: Electrolytes maintained within normal limits  Outcome: Progressing  Goal: Hemodynamic stability and optimal renal function maintained  Outcome: Progressing  Goal: Glucose maintained within prescribed range  Outcome: Progressing     Problem: Hematologic - Adult  Goal: Maintains hematologic stability  Outcome: Progressing    Patient A&O - forgetful at times. Safety maintained. Patient free from falls - patient up with assist x1

## 2025-05-21 LAB
ANION GAP SERPL CALCULATED.3IONS-SCNC: 13 MMOL/L (ref 3–16)
BACTERIA URNS QL MICRO: ABNORMAL /HPF
BILIRUB UR QL STRIP.AUTO: NEGATIVE
BUN SERPL-MCNC: 18 MG/DL (ref 7–20)
CALCIUM SERPL-MCNC: 9.4 MG/DL (ref 8.3–10.6)
CHLORIDE SERPL-SCNC: 100 MMOL/L (ref 99–110)
CLARITY UR: CLEAR
CO2 SERPL-SCNC: 23 MMOL/L (ref 21–32)
COLOR UR: YELLOW
CREAT SERPL-MCNC: 0.8 MG/DL (ref 0.6–1.2)
DEPRECATED RDW RBC AUTO: 13 % (ref 12.4–15.4)
EPI CELLS #/AREA URNS AUTO: 2 /HPF (ref 0–5)
GFR SERPLBLD CREATININE-BSD FMLA CKD-EPI: 74 ML/MIN/{1.73_M2}
GLUCOSE BLD-MCNC: 170 MG/DL (ref 70–99)
GLUCOSE BLD-MCNC: 230 MG/DL (ref 70–99)
GLUCOSE BLD-MCNC: 246 MG/DL (ref 70–99)
GLUCOSE BLD-MCNC: 252 MG/DL (ref 70–99)
GLUCOSE SERPL-MCNC: 163 MG/DL (ref 70–99)
GLUCOSE UR STRIP.AUTO-MCNC: 500 MG/DL
HCT VFR BLD AUTO: 38.4 % (ref 36–48)
HGB BLD-MCNC: 13.5 G/DL (ref 12–16)
HGB UR QL STRIP.AUTO: NEGATIVE
HYALINE CASTS #/AREA URNS AUTO: 1 /LPF (ref 0–8)
KETONES UR STRIP.AUTO-MCNC: NEGATIVE MG/DL
LEUKOCYTE ESTERASE UR QL STRIP.AUTO: ABNORMAL
MCH RBC QN AUTO: 33.4 PG (ref 26–34)
MCHC RBC AUTO-ENTMCNC: 35.2 G/DL (ref 31–36)
MCV RBC AUTO: 94.9 FL (ref 80–100)
NITRITE UR QL STRIP.AUTO: NEGATIVE
PERFORMED ON: ABNORMAL
PH UR STRIP.AUTO: 6 [PH] (ref 5–8)
PLATELET # BLD AUTO: 131 K/UL (ref 135–450)
PMV BLD AUTO: 6.7 FL (ref 5–10.5)
POTASSIUM SERPL-SCNC: 4.6 MMOL/L (ref 3.5–5.1)
PROT UR STRIP.AUTO-MCNC: 30 MG/DL
RBC # BLD AUTO: 4.05 M/UL (ref 4–5.2)
RBC CLUMPS #/AREA URNS AUTO: 1 /HPF (ref 0–4)
REASON FOR REJECTION: NORMAL
REJECTED TEST: NORMAL
REPORT: NORMAL
RESP PATH DNA+RNA PNL NPH NAA+NON-PROBE: NORMAL
SODIUM SERPL-SCNC: 136 MMOL/L (ref 136–145)
SP GR UR STRIP.AUTO: 1.01 (ref 1–1.03)
UA COMPLETE W REFLEX CULTURE PNL UR: YES
UA DIPSTICK W REFLEX MICRO PNL UR: YES
URN SPEC COLLECT METH UR: ABNORMAL
UROBILINOGEN UR STRIP-ACNC: 0.2 E.U./DL
WBC # BLD AUTO: 9 K/UL (ref 4–11)
WBC #/AREA URNS AUTO: 52 /HPF (ref 0–5)

## 2025-05-21 PROCEDURE — 2500000003 HC RX 250 WO HCPCS: Performed by: NURSE PRACTITIONER

## 2025-05-21 PROCEDURE — 81001 URINALYSIS AUTO W/SCOPE: CPT

## 2025-05-21 PROCEDURE — 80048 BASIC METABOLIC PNL TOTAL CA: CPT

## 2025-05-21 PROCEDURE — 6360000002 HC RX W HCPCS: Performed by: INTERNAL MEDICINE

## 2025-05-21 PROCEDURE — 96375 TX/PRO/DX INJ NEW DRUG ADDON: CPT

## 2025-05-21 PROCEDURE — 6370000000 HC RX 637 (ALT 250 FOR IP): Performed by: NURSE PRACTITIONER

## 2025-05-21 PROCEDURE — G0378 HOSPITAL OBSERVATION PER HR: HCPCS

## 2025-05-21 PROCEDURE — 2500000003 HC RX 250 WO HCPCS: Performed by: INTERNAL MEDICINE

## 2025-05-21 PROCEDURE — 87086 URINE CULTURE/COLONY COUNT: CPT

## 2025-05-21 PROCEDURE — 1200000000 HC SEMI PRIVATE

## 2025-05-21 PROCEDURE — 6370000000 HC RX 637 (ALT 250 FOR IP): Performed by: INTERNAL MEDICINE

## 2025-05-21 PROCEDURE — 6360000002 HC RX W HCPCS: Performed by: NURSE PRACTITIONER

## 2025-05-21 PROCEDURE — 51798 US URINE CAPACITY MEASURE: CPT

## 2025-05-21 PROCEDURE — 36415 COLL VENOUS BLD VENIPUNCTURE: CPT

## 2025-05-21 PROCEDURE — 85027 COMPLETE CBC AUTOMATED: CPT

## 2025-05-21 PROCEDURE — 96372 THER/PROPH/DIAG INJ SC/IM: CPT

## 2025-05-21 RX ORDER — NIFEDIPINE 30 MG/1
30 TABLET, EXTENDED RELEASE ORAL ONCE
Status: COMPLETED | OUTPATIENT
Start: 2025-05-21 | End: 2025-05-21

## 2025-05-21 RX ORDER — METOPROLOL SUCCINATE 50 MG/1
50 TABLET, EXTENDED RELEASE ORAL 2 TIMES DAILY
Status: DISCONTINUED | OUTPATIENT
Start: 2025-05-21 | End: 2025-05-22

## 2025-05-21 RX ADMIN — INSULIN LISPRO 2 UNITS: 100 INJECTION, SOLUTION INTRAVENOUS; SUBCUTANEOUS at 12:38

## 2025-05-21 RX ADMIN — LISINOPRIL 40 MG: 20 TABLET ORAL at 09:26

## 2025-05-21 RX ADMIN — SODIUM CHLORIDE, PRESERVATIVE FREE 10 ML: 5 INJECTION INTRAVENOUS at 09:30

## 2025-05-21 RX ADMIN — LEVETIRACETAM 500 MG: 500 TABLET, FILM COATED ORAL at 09:26

## 2025-05-21 RX ADMIN — INSULIN LISPRO 4 UNITS: 100 INJECTION, SOLUTION INTRAVENOUS; SUBCUTANEOUS at 18:00

## 2025-05-21 RX ADMIN — MELATONIN TAB 3 MG 3 MG: 3 TAB at 20:29

## 2025-05-21 RX ADMIN — TRAZODONE HYDROCHLORIDE 50 MG: 50 TABLET ORAL at 20:29

## 2025-05-21 RX ADMIN — DICYCLOMINE HYDROCHLORIDE 10 MG: 10 CAPSULE ORAL at 05:03

## 2025-05-21 RX ADMIN — CEFTRIAXONE SODIUM 1000 MG: 1 INJECTION, POWDER, FOR SOLUTION INTRAMUSCULAR; INTRAVENOUS at 09:30

## 2025-05-21 RX ADMIN — INSULIN LISPRO 2 UNITS: 100 INJECTION, SOLUTION INTRAVENOUS; SUBCUTANEOUS at 20:35

## 2025-05-21 RX ADMIN — Medication 325 MG: at 09:26

## 2025-05-21 RX ADMIN — ASPIRIN 81 MG: 81 TABLET, CHEWABLE ORAL at 09:30

## 2025-05-21 RX ADMIN — ATORVASTATIN CALCIUM 20 MG: 20 TABLET, FILM COATED ORAL at 20:29

## 2025-05-21 RX ADMIN — NIFEDIPINE 30 MG: 30 TABLET, FILM COATED, EXTENDED RELEASE ORAL at 18:00

## 2025-05-21 RX ADMIN — SODIUM CHLORIDE, PRESERVATIVE FREE 10 ML: 5 INJECTION INTRAVENOUS at 20:30

## 2025-05-21 RX ADMIN — DONEPEZIL HYDROCHLORIDE 5 MG: 5 TABLET ORAL at 09:26

## 2025-05-21 RX ADMIN — DICYCLOMINE HYDROCHLORIDE 10 MG: 10 CAPSULE ORAL at 15:55

## 2025-05-21 RX ADMIN — INSULIN GLARGINE 25 UNITS: 100 INJECTION, SOLUTION SUBCUTANEOUS at 20:35

## 2025-05-21 RX ADMIN — LEVETIRACETAM 500 MG: 500 TABLET, FILM COATED ORAL at 20:29

## 2025-05-21 RX ADMIN — DICYCLOMINE HYDROCHLORIDE 10 MG: 10 CAPSULE ORAL at 12:38

## 2025-05-21 RX ADMIN — ENOXAPARIN SODIUM 40 MG: 100 INJECTION SUBCUTANEOUS at 09:29

## 2025-05-21 RX ADMIN — METOPROLOL SUCCINATE 50 MG: 50 TABLET, FILM COATED, EXTENDED RELEASE ORAL at 20:29

## 2025-05-21 RX ADMIN — PAROXETINE HYDROCHLORIDE 10 MG: 20 TABLET, FILM COATED ORAL at 09:26

## 2025-05-21 ASSESSMENT — PAIN SCALES - GENERAL
PAINLEVEL_OUTOF10: 0
PAINLEVEL_OUTOF10: 0

## 2025-05-21 NOTE — PROGRESS NOTES
Hospitalist Progress Note      Name:  Mckenna Morin /Age/Sex: 1945  (79 y.o. female)   MRN & CSN:  [id number] & [id number] Encounter Date/Time: 2025 9:17 AM EDT   Location:  [facility]3313/3313-01 PCP: Martinez Casey DO     Attending:Sreekanth Rivero MD       Hospital Day: 3    Subjective:   Chief Complaint:   Chief Complaint   Patient presents with    Chest Pain     Pt brought in by [facility] from [facility] c/o chest pain. Pt was given nitro, zofran, and 4 baby aspirin en route. Pt febrile at triage, 100.1F     Mckenna Morin is a 79 y.o. female with a past medical history of hypertension, hyperlipidemia, DM2, CAD, hypothyroidism, metabolic encephalopathy, seizures, dementia who presented with chest pain from [facility]. Trop negative x 2.  EKG without acute ST changes.      Interval History:  Today, she is resting in bed, she feels well, denies urinary frequency, urgency or dysuria.      Independently reviewed interval ancillary notes from cardiology.     Assessment and Recommendations   Problem List  Principal Problem:    Chest pain  Resolved Problems:    * No resolved hospital problems. *     Assessment and Plan:    Fever   - Tmax 100.4   - Admits to cough, denies dysuria, no further CP   - CXR was nonacute   - Add procal   - Add urinalysis concerning for possible UTI   - Continue IV ceftriaxone   - Follow urine culture    - No recurrent fever   - Respiratory panel neg    - No leukocytosis   - Blood cultures x 2 pending  Chest Pain  CAD   - No further CP or SOB              - Stress test no ischemia              - Echo 60-65% GIIDD, mild to moderate AS, small pericardial effusion without tamponade              - D-dimer <0.27               - Cardiology consulted- Echo and stress test, discussed with cardiology after testing and was reviewed with no further workup planned and OK with discharge               - Continue ASA, statin, BB   Hypertension              - Uncontrolled

## 2025-05-21 NOTE — PLAN OF CARE
Problem: Chronic Conditions and Co-morbidities  Goal: Patient's chronic conditions and co-morbidity symptoms are monitored and maintained or improved  Outcome: Progressing     Problem: Discharge Planning  Goal: Discharge to home or other facility with appropriate resources  Outcome: Progressing     Problem: Pain  Goal: Verbalizes/displays adequate comfort level or baseline comfort level  Outcome: Progressing     Problem: Safety - Adult  Goal: Free from fall injury  Outcome: Progressing     Problem: Neurosensory - Adult  Goal: Achieves maximal functionality and self care  Outcome: Progressing     Problem: Respiratory - Adult  Goal: Achieves optimal ventilation and oxygenation  Outcome: Progressing     Problem: Cardiovascular - Adult  Goal: Maintains optimal cardiac output and hemodynamic stability  Outcome: Progressing  Goal: Absence of cardiac dysrhythmias or at baseline  Outcome: Progressing     Problem: Skin/Tissue Integrity - Adult  Goal: Skin integrity remains intact  Outcome: Progressing     Problem: Musculoskeletal - Adult  Goal: Return mobility to safest level of function  Outcome: Progressing  Goal: Return ADL status to a safe level of function  Outcome: Progressing     Problem: Gastrointestinal - Adult  Goal: Maintains adequate nutritional intake  Outcome: Progressing     Problem: Metabolic/Fluid and Electrolytes - Adult  Goal: Electrolytes maintained within normal limits  Outcome: Progressing  Goal: Hemodynamic stability and optimal renal function maintained  Outcome: Progressing  Goal: Glucose maintained within prescribed range  Outcome: Progressing     Problem: Hematologic - Adult  Goal: Maintains hematologic stability  Outcome: Progressing

## 2025-05-21 NOTE — CARE COORDINATION
Discharge Planning Note:    Chart reviewed and it appears that patient has minimal needs for discharge at this time. Risk Score OBS %     Primary Care Physician is Martinez Casey DO    Primary insurance is Medicare    Patient is from [facility].    Please notify case management if any discharge needs are identified.      Case management will continue to follow progress and update discharge plan as needed.   Electronically signed by MIKHAIL Polanco on 5/21/25 at 3:27 PM EDT

## 2025-05-22 VITALS
DIASTOLIC BLOOD PRESSURE: 68 MMHG | WEIGHT: 141.7 LBS | OXYGEN SATURATION: 94 % | SYSTOLIC BLOOD PRESSURE: 123 MMHG | BODY MASS INDEX: 21.47 KG/M2 | RESPIRATION RATE: 18 BRPM | HEIGHT: 68 IN | TEMPERATURE: 97.9 F | HEART RATE: 74 BPM

## 2025-05-22 LAB
ANION GAP SERPL CALCULATED.3IONS-SCNC: 12 MMOL/L (ref 3–16)
BACTERIA UR CULT: NORMAL
BASOPHILS # BLD: 0 K/UL (ref 0–0.2)
BASOPHILS NFR BLD: 0.3 %
BUN SERPL-MCNC: 23 MG/DL (ref 7–20)
CALCIUM SERPL-MCNC: 9.3 MG/DL (ref 8.3–10.6)
CHLORIDE SERPL-SCNC: 100 MMOL/L (ref 99–110)
CO2 SERPL-SCNC: 24 MMOL/L (ref 21–32)
CREAT SERPL-MCNC: 1 MG/DL (ref 0.6–1.2)
DEPRECATED RDW RBC AUTO: 13 % (ref 12.4–15.4)
EOSINOPHIL # BLD: 0.1 K/UL (ref 0–0.6)
EOSINOPHIL NFR BLD: 1.4 %
GFR SERPLBLD CREATININE-BSD FMLA CKD-EPI: 57 ML/MIN/{1.73_M2}
GLUCOSE BLD-MCNC: 193 MG/DL (ref 70–99)
GLUCOSE BLD-MCNC: 224 MG/DL (ref 70–99)
GLUCOSE SERPL-MCNC: 187 MG/DL (ref 70–99)
HCT VFR BLD AUTO: 40.2 % (ref 36–48)
HGB BLD-MCNC: 14.2 G/DL (ref 12–16)
LACTATE BLDV-SCNC: 1.2 MMOL/L (ref 0.4–2)
LYMPHOCYTES # BLD: 2.3 K/UL (ref 1–5.1)
LYMPHOCYTES NFR BLD: 26.1 %
MCH RBC QN AUTO: 33 PG (ref 26–34)
MCHC RBC AUTO-ENTMCNC: 35.2 G/DL (ref 31–36)
MCV RBC AUTO: 93.7 FL (ref 80–100)
MONOCYTES # BLD: 0.5 K/UL (ref 0–1.3)
MONOCYTES NFR BLD: 6.2 %
NEUTROPHILS # BLD: 5.7 K/UL (ref 1.7–7.7)
NEUTROPHILS NFR BLD: 66 %
PERFORMED ON: ABNORMAL
PERFORMED ON: ABNORMAL
PLATELET # BLD AUTO: 162 K/UL (ref 135–450)
PMV BLD AUTO: 6.8 FL (ref 5–10.5)
POTASSIUM SERPL-SCNC: 4.4 MMOL/L (ref 3.5–5.1)
RBC # BLD AUTO: 4.29 M/UL (ref 4–5.2)
SODIUM SERPL-SCNC: 136 MMOL/L (ref 136–145)
WBC # BLD AUTO: 8.7 K/UL (ref 4–11)

## 2025-05-22 PROCEDURE — 2500000003 HC RX 250 WO HCPCS: Performed by: NURSE PRACTITIONER

## 2025-05-22 PROCEDURE — 2500000003 HC RX 250 WO HCPCS: Performed by: INTERNAL MEDICINE

## 2025-05-22 PROCEDURE — 6360000002 HC RX W HCPCS: Performed by: INTERNAL MEDICINE

## 2025-05-22 PROCEDURE — 6370000000 HC RX 637 (ALT 250 FOR IP): Performed by: NURSE PRACTITIONER

## 2025-05-22 PROCEDURE — 6360000002 HC RX W HCPCS: Performed by: NURSE PRACTITIONER

## 2025-05-22 PROCEDURE — 83605 ASSAY OF LACTIC ACID: CPT

## 2025-05-22 PROCEDURE — 80048 BASIC METABOLIC PNL TOTAL CA: CPT

## 2025-05-22 PROCEDURE — 36415 COLL VENOUS BLD VENIPUNCTURE: CPT

## 2025-05-22 PROCEDURE — 6370000000 HC RX 637 (ALT 250 FOR IP): Performed by: INTERNAL MEDICINE

## 2025-05-22 PROCEDURE — 85025 COMPLETE CBC W/AUTO DIFF WBC: CPT

## 2025-05-22 RX ORDER — METOPROLOL SUCCINATE 50 MG/1
50 TABLET, EXTENDED RELEASE ORAL DAILY
Status: DISCONTINUED | OUTPATIENT
Start: 2025-05-23 | End: 2025-05-22 | Stop reason: HOSPADM

## 2025-05-22 RX ORDER — METOPROLOL SUCCINATE 25 MG/1
25 TABLET, EXTENDED RELEASE ORAL 2 TIMES DAILY
Qty: 30 TABLET | Refills: 3 | Status: SHIPPED | OUTPATIENT
Start: 2025-05-22

## 2025-05-22 RX ORDER — LISINOPRIL 40 MG/1
40 TABLET ORAL DAILY
DISCHARGE
Start: 2025-05-23

## 2025-05-22 RX ADMIN — METOPROLOL SUCCINATE 50 MG: 50 TABLET, FILM COATED, EXTENDED RELEASE ORAL at 08:39

## 2025-05-22 RX ADMIN — INSULIN LISPRO 2 UNITS: 100 INJECTION, SOLUTION INTRAVENOUS; SUBCUTANEOUS at 12:05

## 2025-05-22 RX ADMIN — Medication 325 MG: at 08:39

## 2025-05-22 RX ADMIN — ENOXAPARIN SODIUM 40 MG: 100 INJECTION SUBCUTANEOUS at 08:38

## 2025-05-22 RX ADMIN — DICYCLOMINE HYDROCHLORIDE 10 MG: 10 CAPSULE ORAL at 08:39

## 2025-05-22 RX ADMIN — SODIUM CHLORIDE, PRESERVATIVE FREE 10 ML: 5 INJECTION INTRAVENOUS at 08:40

## 2025-05-22 RX ADMIN — LISINOPRIL 40 MG: 20 TABLET ORAL at 08:39

## 2025-05-22 RX ADMIN — LEVETIRACETAM 500 MG: 500 TABLET, FILM COATED ORAL at 08:39

## 2025-05-22 RX ADMIN — PAROXETINE HYDROCHLORIDE 10 MG: 20 TABLET, FILM COATED ORAL at 08:39

## 2025-05-22 RX ADMIN — DONEPEZIL HYDROCHLORIDE 5 MG: 5 TABLET ORAL at 08:39

## 2025-05-22 RX ADMIN — INSULIN LISPRO 2 UNITS: 100 INJECTION, SOLUTION INTRAVENOUS; SUBCUTANEOUS at 08:37

## 2025-05-22 RX ADMIN — DICYCLOMINE HYDROCHLORIDE 10 MG: 10 CAPSULE ORAL at 12:05

## 2025-05-22 RX ADMIN — CEFTRIAXONE SODIUM 1000 MG: 1 INJECTION, POWDER, FOR SOLUTION INTRAMUSCULAR; INTRAVENOUS at 08:38

## 2025-05-22 RX ADMIN — ASPIRIN 81 MG: 81 TABLET, CHEWABLE ORAL at 08:39

## 2025-05-22 ASSESSMENT — PAIN SCALES - GENERAL
PAINLEVEL_OUTOF10: 0
PAINLEVEL_OUTOF10: 0

## 2025-05-22 NOTE — CARE COORDINATION
Case Management -  Discharge Note      Patient Name: Mckenna Morin                   YOB: 1945  Room: [address] [place] [address]            Readmission Risk (Low < 19, Mod (19-27), High > 27): Readmission Risk Score: 13.3    Current PCP: Martinez Casey DO      (IMM) Important Message from Medicare:    Has pt received appropriate compliance notices before being discharged if required: yes  Compliance doc:  [x] 2nd IMM; [] Code 44 [] [place]  Date Given: 5/22/25 Given By: ZACH    PT AM-PAC:   /24  OT AM-PAC:   /24    Patient/patient representative has been educated on the benefits of Assisted Living as well as the possible risks of declining recommended services. Patient/patient representative has acknowledged the information provided and decided on the following discharge plan. Patient/ patient representative has been provided freedom of choice regarding service provider, supported by basic dialogue that supports the patient's individualized plan of care/goals.    Patient noted to have a discharge order.  Pt has been medically cleared to return to LTC (Long Term Care)    Patient discharged to     [facility] (Assisted Living)  [address] [place] [address]  Phone: [phone]  Fax: [phone]  Nurse Report Line: [phone]        Pre-cert Required/obtained: NA  Transportation scheduled for 4:15PM  Transportation provided by [facility]    [facility] agency notified of discharge:  [] Yes [] No  [x] NA    Family notified of discharge:  [x] Yes  [] No  [] NA    Facility notified of discharge:  [x] Yes  [] No  [] NA    Pt is being discharged with Outpt IV Antibiotics  [] Yes [] No  [x] NA  If yes, make sure MAGALY is faxed to [facility] agency, and meds are called in to pharmacy by RN from MAGALY orders only.      Financial    Payor: MEDICARE / Plan: MEDICARE PART A AND B / Product Type: *No Product type* /     Pharmacy:  Potential assistance Purchasing Medications: No  Meds-to-Beds request:  No      [facility] - [place], [place] - [address] - P [phone] - F [phone]  [address] [facility] [address]  Phone: [phone] Fax: [phone]      Notes:    Additional Case Management Notes: See above.  Electronically signed by MIKHAIL Polanco on 5/22/25 at 2:58 PM EDT

## 2025-05-22 NOTE — PROGRESS NOTES
IV removed for discharge. Paperwork sent with medical transport. Attempted to call report to [facility], sent to voicemail, left name and callback number.

## 2025-05-22 NOTE — DISCHARGE INSTR - COC
Continuity of Care Form    Patient Name: Mckenna Morin   :  1945  MRN:  [id number]    Admit date:  2025  Discharge date:  25    Code Status Order: Full Code   Advance Directives:     Admitting Physician:  Sreekanth Rivero MD  PCP: Martinez Casey DO    Discharging Nurse: GABRIEL Hall  Discharging Hospital Unit/Room#: 3AN-3313/3313-01  Discharging Unit Phone Number: [phone]    Emergency Contact:   Extended Emergency Contact Information  Primary Emergency Contact: Iram Thomson  Home Phone: [phone]  Mobile Phone: [phone]  Relation: Other  Secondary Emergency Contact: Bryant Morin (Colton)  Home Phone: [phone]  Relation: Spouse    Past Surgical History:  Past Surgical History:   Procedure Laterality Date     SECTION      CHOLECYSTECTOMY      FRACTURE SURGERY      ankle    JOINT REPLACEMENT      patient is unsure       Immunization History:   Immunization History   Administered Date(s) Administered    COVID-19, J&J, (age 18y+), IM, 0.5 mL 2021    COVID-19, PFIZER PURPLE top, DILUTE for use, (age 12 y+), 30mcg/0.3mL 2021    COVID-19, PFIZER, , (age 12y+), IM, 30mcg/0.3mL 2024    TD 5LF, TENIVAC, (age 7y+), IM, 0.5mL 2022       Active Problems:  Patient Active Problem List   Diagnosis Code    Syncope and collapse R55    Chest pain R07.9       Isolation/Infection:   Isolation            No Isolation          Patient Infection Status    None to display              Nurse Assessment:  Last Vital Signs: /68   Pulse 74   Temp 97.9 °F (36.6 °C) (Oral)   Resp 18   Ht 1.727 m (5' 8\")   Wt 64.3 kg (141 lb 11.2 oz)   SpO2 94%   BMI 21.55 kg/m²     Last documented pain score (0-10 scale): Pain Level: 0  Last Weight:   Wt Readings from Last 1 Encounters:   25 64.3 kg (141 lb 11.2 oz)     Mental Status:  disoriented and alert    IV Access:  - None    Nursing Mobility/ADLs:  Walking   Assisted  Transfer  Assisted  Bathing

## 2025-05-22 NOTE — PROGRESS NOTES
Shift assessment completed, see flowsheets.  Medications administered, see MAR. Vital signs stable.  Plan of care discussed with patient. Fall precautions in place. Call light and bedside table within reach. Nonskid footwear on.  Pt denies further needs at this time.  Will continue to monitor.  Isabel Tapia RN

## 2025-05-22 NOTE — CARE COORDINATION
05/22/25 1354   IMM Letter   IMM Letter given to Patient/Family/Significant other/Guardian/POA/by: IMM Given   IMM Letter date given: 05/22/25   IMM Letter time given: 1354

## 2025-05-24 LAB
BACTERIA BLD CULT ORG #2: NORMAL
BACTERIA BLD CULT: NORMAL

## 2025-05-30 NOTE — PROGRESS NOTES
Physician Progress Note      PATIENT:               KATHERYN KEITH  CSN #:                  [id number]  :                       1945  ADMIT DATE:       2025 6:51 PM  DISCH DATE:        2025 5:05 PM  RESPONDING  PROVIDER #:        Bashir Haynes MD          QUERY TEXT:    Chest pain is documented in the medical record. Please specify the suspected   underlying cause:    The clinical indicators include:  Patient presented with Chest Pain and has a history of CAD. Evaluation   revealed \"Trop negative x 2, EKG without acute ST changes, stress test no   ischemia, echo with no acute findings normal EF\" per the 25 DS. While the   DS noted, \"trop negative x 2\", troponins actually had 3 lab readings of 12,   12 and 16. The 25 cardiology consult noted, \"Atypical chest pain but   patient has risk factors-mild trop elevation.\" Treatment included continuation   of ASA, statin and BB and BB [Metoprolol} was increased to BID  at discharge. After study, can the suspected etiology of the CP be clarified   as:  Options provided:  -- Chest pain likely related to CAD without unstable angina  -- Chest pain likely related to NSTEMI  -- Chest pain likely related to demand ischemia only, no MI  -- Other - I will add my own diagnosis  -- Disagree - Not applicable / Not valid  -- Disagree - Clinically unable to determine / Unknown  -- Refer to Clinical Documentation Reviewer    PROVIDER RESPONSE TEXT:    This patient has chest pain likely related to demand ischemia only, no MI.    Query created by: Joaquim Cid on 2025 1:19 PM      Electronically signed by:  Bashir Haynes MD 2025 10:06 AM

## 2025-06-01 NOTE — PROGRESS NOTES
Physician Progress Note      PATIENT:               KATHERYN KEITH  [facility] #:                  [id number]  :                       1945  ADMIT DATE:       2025 6:51 PM  DISCH DATE:        2025 5:05 PM  RESPONDING  PROVIDER #:        KIKA Pierce CNP          QUERY TEXT:    Noted documentation of \"Chest pain likely due to demand ischemia only, no MI\"   via earlier query response by Cardiology.  Please document if you are in   agreement with Cardiology consult regarding likely etiology of chest pain.    The clinical indicators include:  Patient presented with Chest Pain and has a history of CAD. Evaluation   revealed \"Trop negative x 2, EKG without acute ST changes, stress test no   ischemia, echo with no acute findings normal EF\" per the 25 DS. While the   DS noted, \"trop negative x 2\", troponins actually had 3 lab readings of 12,   12 and 16. The 25 cardiology consult noted, \"Atypical chest pain but   patient has risk factors-mild trop elevation.\" Treatment included continuation   of ASA, statin and BB and BB [Metoprolol} was increased to BID  at discharge. After study, can the suspected etiology of the CP be clarified   as:  Per Cardiology: Chest pain due to demand ischemia only, no MI  Options provided:  -- Chest pain likely due to demand ischemi only, no MI  -- Chest pain not due to demand ischemia, more likely due to, Please document   suspected likely etiology of chest pain.  -- Defer to Cardiology consultant documentation regarding chest pain likely   due to demand ischemia only, no MI  -- Other - I will add my own diagnosis  -- Disagree - Not applicable / Not valid  -- Disagree - Clinically unable to determine / Unknown  -- Refer to Clinical Documentation Reviewer    PROVIDER RESPONSE TEXT:    Chest pain likely due to demand ischemia only, no MI.    Query created by: Joaquim Cid on 2025 12:42 PM      Electronically signed by:  KIKA Pierce CNP 2025 10:23